# Patient Record
Sex: FEMALE | Race: WHITE | NOT HISPANIC OR LATINO | Employment: FULL TIME | URBAN - METROPOLITAN AREA
[De-identification: names, ages, dates, MRNs, and addresses within clinical notes are randomized per-mention and may not be internally consistent; named-entity substitution may affect disease eponyms.]

---

## 2019-07-31 ENCOUNTER — DOCUMENTATION (OUTPATIENT)
Dept: MULTI SPECIALTY CLINIC | Facility: CLINIC | Age: 54
End: 2019-07-31

## 2019-07-31 DIAGNOSIS — M20.22 HALLUX RIGIDUS OF LEFT FOOT: Primary | ICD-10-CM

## 2019-08-14 ENCOUNTER — HOSPITAL ENCOUNTER (OUTPATIENT)
Dept: RADIOLOGY | Facility: HOSPITAL | Age: 54
Discharge: HOME | End: 2019-08-14
Attending: PODIATRIST
Payer: COMMERCIAL

## 2019-08-14 DIAGNOSIS — M20.22 HALLUX RIGIDUS OF LEFT FOOT: ICD-10-CM

## 2019-08-14 PROCEDURE — 73630 X-RAY EXAM OF FOOT: CPT

## 2019-08-19 RX ORDER — BUPROPION HYDROCHLORIDE 150 MG/1
150 TABLET, EXTENDED RELEASE ORAL 2 TIMES DAILY
COMMUNITY
End: 2022-04-28

## 2019-08-19 RX ORDER — OMEGA-3 FATTY ACIDS/FISH OIL 300-1000MG
CAPSULE ORAL AS NEEDED
COMMUNITY

## 2019-08-19 NOTE — PRE-PROCEDURE INSTRUCTIONS
Pre-Surgery Instructions:   Medication Instructions    buPROPion (WELLBUTRIN SR) 150 mg 12 hr tablet Instructed patient per Anesthesia Guidelines   Ibuprofen (ADVIL) 200 MG CAPS Instructed patient per Anesthesia Guidelines  Patient prefers to take no meds the morning of surgery  Patient given/ instructed on use of chlorhexidine soap per hospital protocol    Patient instructed to stop all ASA, NSAIDS, vitamins and herbal supplements one week prior to surgery or per Dr Heath Rey

## 2019-08-23 ENCOUNTER — OFFICE VISIT (OUTPATIENT)
Dept: CARDIOLOGY CLINIC | Facility: CLINIC | Age: 54
End: 2019-08-23
Payer: COMMERCIAL

## 2019-08-23 VITALS
SYSTOLIC BLOOD PRESSURE: 134 MMHG | BODY MASS INDEX: 36.15 KG/M2 | HEIGHT: 65 IN | DIASTOLIC BLOOD PRESSURE: 88 MMHG | HEART RATE: 84 BPM | WEIGHT: 217 LBS

## 2019-08-23 DIAGNOSIS — R03.0 ELEVATED BLOOD PRESSURE READING: Primary | ICD-10-CM

## 2019-08-23 DIAGNOSIS — Z01.810 PREOP CARDIOVASCULAR EXAM: ICD-10-CM

## 2019-08-23 DIAGNOSIS — E78.5 HYPERLIPIDEMIA, UNSPECIFIED HYPERLIPIDEMIA TYPE: ICD-10-CM

## 2019-08-23 PROCEDURE — 99203 OFFICE O/P NEW LOW 30 MIN: CPT | Performed by: INTERNAL MEDICINE

## 2019-08-23 NOTE — PATIENT INSTRUCTIONS
Therapeutic lifestyle changes were discussed with the patient- Specifically:  1  Decreasing saturated fat intake to less than 13 g per day  I showed the pt how to look at a food label  Watch intake of cheese, red meat, cream and butter containing foods  2  Increase soluble fiber in the diet-beans, pears, oatmeal  3  Weight loss of even 5-10 pounds will also help to lower cholesterol levels  Decreasing caloric intake by about 100 rick a day-should lead to a weight loss of 1-2 pounds over one month  4  Increase aerobic physical activity - ultimate goal of 150 min per week  Start low and increase level and duration of activity slowly  5   Google 'TLC Cholesterol" = Your guide to lowering your cholesterol with TLC is probably the best online resource to lower your LDL cholesterol

## 2019-08-23 NOTE — PROGRESS NOTES
*/                                           Cardiology Consultation     Patricia Diaz  [id number]  1965  HEART & VASCULAR [facility]  [address] [facility] [place] [address]      There are no diagnoses linked to this encounter  I had the pleasure of seeing Patricia Diaz for a consultation regarding preop clearance requested by [facility]    History of the Presenting Illness, Discussion/Summary and my Plan are as follows:::    She is a pleasant 52-year-old lady with a history of hyperlipidemia-mild, no diagnosed hypertension, no diabetes, prior history of tobacco use-abou 33 pack years-quit about 4 years ago, a possible family history of premature coronary artery disease- biological father had heart attack followed by CABG at 48  However till 1 year ago she was physically very active-going for brisk walks-over 7 metabolic equivalents, without any cardiac symptoms  Subsequently she developed a bone spur in the left great to and her activity level has been limited by that  She will be undergoing surgery the same  Cardiac physical exam is unremarkable except for a systolic murmur of aortic sclerosis  Lipid profile-August 2019-total cholesterol 213, triglycerides 73, HDL 64, , A1c 5 6  A diet history was also obtained-high intake of red meat, moderate intake of cheese, minimal intake of butter and ice cream     ECG showed borderline LVH, otherwise normal (done at [facility])  Plan:    Preoperative evaluation prior to surgery-left great to-for bone spur:  Can proceed at low cardiac risk without further cardiac testing  ECG was essentially unremarkable  8/19/2019    Elevated blood pressures:  Does not have a diagnosis of hypertension, she will continue to monitor at home and let us know  Dyslipidemia:  Mild, therapeutic lifestyle changes were discussed, can be recheck in 3 months      I also told her to establish with a primary care physician for routine and preventative care  Follow-up in 6 months      No diagnosis found  There is no problem list on file for this patient  Past Medical History:   Diagnosis Date    Dental crown present     History of motion sickness     History of pneumonia     Left foot pain     "bone spur"    Right shoulder pain     and right rotator cuff problem    Stress     situational    Thalassemia     Wears contact lenses     Wears glasses      Social History     Socioeconomic History    Marital status: Single     Spouse name: Not on file    Number of children: Not on file    Years of education: Not on file    Highest education level: Not on file   Occupational History    Not on file   Social Needs    Financial resource strain: Not on file    Food insecurity:     Worry: Not on file     Inability: Not on file    Transportation needs:     Medical: Not on file     Non-medical: Not on file   Tobacco Use    Smoking status: Former Smoker    Smokeless tobacco: Never Used   Substance and Sexual Activity    Alcohol use: Yes     Comment: socially    Drug use: Never    Sexual activity: Not on file   Lifestyle    Physical activity:     Days per week: Not on file     Minutes per session: Not on file    Stress: Not on file   Relationships    Social connections:     Talks on phone: Not on file     Gets together: Not on file     Attends Faith service: Not on file     Active member of club or organization: Not on file     Attends meetings of clubs or organizations: Not on file     Relationship status: Not on file    Intimate partner violence:     Fear of current or ex partner: Not on file     Emotionally abused: Not on file     Physically abused: Not on file     Forced sexual activity: Not on file   Other Topics Concern    Not on file   Social History Narrative    Not on file      No family history on file    Past Surgical History:   Procedure Laterality Date    APPENDECTOMY      BREAST SURGERY Left     lump removed noncancer    MOUTH SURGERY      ROTATOR CUFF REPAIR Left     ROTATOR CUFF REPAIR Right     "eventually needs replacement"       Current Outpatient Medications:     buPROPion (WELLBUTRIN SR) 150 mg 12 hr tablet, Take 150 mg by mouth 2 (two) times a day, Disp: , Rfl:     Ibuprofen (ADVIL) 200 MG CAPS, Take by mouth as needed, Disp: , Rfl:   Allergies   Allergen Reactions    Erythromycin Vomiting     "projectile vomiting"    Morphine Headache     Category: Adverse Reaction;      Vitals:    08/23/19 1109   BP: 134/88   BP Location: Left arm   Patient Position: Sitting   Cuff Size: Large   Pulse: 84   Weight: 98 4 kg (217 lb)   Height: 5' 5" (1 651 m)         Imaging: No results found  Review of Systems:  Review of Systems   Constitutional: Negative  HENT: Negative  Eyes: Negative  Respiratory: Negative  Cardiovascular: Negative  Endocrine: Negative  Musculoskeletal: Negative  Physical Exam:    /88 (BP Location: Left arm, Patient Position: Sitting, Cuff Size: Large)   Pulse 84   Ht 5' 5" (1 651 m)   Wt 98 4 kg (217 lb)   BMI 36 11 kg/m²   Physical Exam   Constitutional: She appears well-developed and well-nourished  No distress  HENT:   Head: Normocephalic and atraumatic  Eyes: Pupils are equal, round, and reactive to light  Conjunctivae are normal  Right eye exhibits no discharge  Left eye exhibits no discharge  No scleral icterus  Neck: Normal range of motion  No JVD present  No tracheal deviation present  No thyromegaly present  Cardiovascular: Normal rate and regular rhythm  Murmur heard  Pulmonary/Chest: Effort normal and breath sounds normal  No stridor  No respiratory distress  She has no wheezes  Abdominal: Soft  Bowel sounds are normal  She exhibits no distension  There is no tenderness  There is no guarding  Musculoskeletal: Normal range of motion  She exhibits no edema, tenderness or deformity  Skin: Skin is warm  No rash noted   She is not diaphoretic  No erythema  No pallor

## 2019-08-23 NOTE — PROGRESS NOTES
HEART & VASCULAR Ashish Leonard  [facility] [address] [address]      PATIENT NAME: Christina Chanel; [id number]    YOB: 1965    DATE LAST EVALUATED: 8/23/19    DETAILS OF SURGERY: Left foot toe spur    DATE OF SURGERY: 8/28/19    SURGEON: Dr Whittington    ANESTHESIA: Choice     CARDIAC RISK ASSESSMENT:    Can proceed at Low risk without further testing which is unlikely to change the management

## 2019-08-23 NOTE — H&P (VIEW-ONLY)
HEART & VASCULAR Gabriel Mera  [facility] [address] [address]      PATIENT NAME: Velia Beach; [id number]    YOB: 1965    DATE LAST EVALUATED: 8/23/19    DETAILS OF SURGERY: Left foot toe spur    DATE OF SURGERY: 8/28/19    SURGEON: Dr Whittington    ANESTHESIA: Choice     CARDIAC RISK ASSESSMENT:    Can proceed at Low risk without further testing which is unlikely to change the management

## 2019-08-27 ENCOUNTER — ANESTHESIA EVENT (OUTPATIENT)
Dept: PERIOP | Facility: HOSPITAL | Age: 54
End: 2019-08-27
Payer: COMMERCIAL

## 2019-08-28 ENCOUNTER — HOSPITAL ENCOUNTER (OUTPATIENT)
Facility: HOSPITAL | Age: 54
Setting detail: OUTPATIENT SURGERY
Discharge: HOME | End: 2019-08-28
Attending: PODIATRIST | Admitting: PODIATRIST
Payer: COMMERCIAL

## 2019-08-28 ENCOUNTER — ANESTHESIA (OUTPATIENT)
Dept: PERIOP | Facility: HOSPITAL | Age: 54
End: 2019-08-28
Payer: COMMERCIAL

## 2019-08-28 ENCOUNTER — APPOINTMENT (OUTPATIENT)
Dept: RADIOLOGY | Facility: HOSPITAL | Age: 54
End: 2019-08-28
Payer: COMMERCIAL

## 2019-08-28 ENCOUNTER — HOSPITAL ENCOUNTER (OUTPATIENT)
Dept: RADIOLOGY | Facility: HOSPITAL | Age: 54
Setting detail: OUTPATIENT SURGERY
Discharge: HOME | End: 2019-08-28
Payer: COMMERCIAL

## 2019-08-28 VITALS
DIASTOLIC BLOOD PRESSURE: 65 MMHG | OXYGEN SATURATION: 97 % | HEART RATE: 85 BPM | HEIGHT: 65 IN | SYSTOLIC BLOOD PRESSURE: 123 MMHG | TEMPERATURE: 99.2 F | WEIGHT: 210 LBS | RESPIRATION RATE: 18 BRPM | BODY MASS INDEX: 34.99 KG/M2

## 2019-08-28 DIAGNOSIS — G89.18 POST-OPERATIVE PAIN: Primary | ICD-10-CM

## 2019-08-28 DIAGNOSIS — M19.072 PRIMARY OSTEOARTHRITIS, LEFT ANKLE AND FOOT: ICD-10-CM

## 2019-08-28 LAB
EXT PREGNANCY TEST URINE: NEGATIVE
EXT. CONTROL: NORMAL

## 2019-08-28 PROCEDURE — 81025 URINE PREGNANCY TEST: CPT | Performed by: ANESTHESIOLOGY

## 2019-08-28 PROCEDURE — 73630 X-RAY EXAM OF FOOT: CPT

## 2019-08-28 PROCEDURE — C1713 ANCHOR/SCREW BN/BN,TIS/BN: HCPCS | Performed by: PODIATRIST

## 2019-08-28 DEVICE — IMPLANTABLE DEVICE: Type: IMPLANTABLE DEVICE | Site: TOE | Status: FUNCTIONAL

## 2019-08-28 RX ORDER — ONDANSETRON 2 MG/ML
INJECTION INTRAMUSCULAR; INTRAVENOUS AS NEEDED
Status: DISCONTINUED | OUTPATIENT
Start: 2019-08-28 | End: 2019-08-28 | Stop reason: SURG

## 2019-08-28 RX ORDER — ACETAMINOPHEN 325 MG/1
975 TABLET ORAL EVERY 6 HOURS PRN
Status: DISCONTINUED | OUTPATIENT
Start: 2019-08-28 | End: 2019-08-28 | Stop reason: HOSPADM

## 2019-08-28 RX ORDER — EPHEDRINE SULFATE 50 MG/ML
INJECTION INTRAVENOUS AS NEEDED
Status: DISCONTINUED | OUTPATIENT
Start: 2019-08-28 | End: 2019-08-28 | Stop reason: SURG

## 2019-08-28 RX ORDER — SODIUM CHLORIDE 9 MG/ML
125 INJECTION, SOLUTION INTRAVENOUS CONTINUOUS
Status: DISCONTINUED | OUTPATIENT
Start: 2019-08-28 | End: 2019-08-28 | Stop reason: HOSPADM

## 2019-08-28 RX ORDER — CEFAZOLIN SODIUM 2 G/50ML
2000 SOLUTION INTRAVENOUS ONCE
Status: COMPLETED | OUTPATIENT
Start: 2019-08-28 | End: 2019-08-28

## 2019-08-28 RX ORDER — MAGNESIUM HYDROXIDE 1200 MG/15ML
LIQUID ORAL AS NEEDED
Status: DISCONTINUED | OUTPATIENT
Start: 2019-08-28 | End: 2019-08-28 | Stop reason: HOSPADM

## 2019-08-28 RX ORDER — KETOROLAC TROMETHAMINE 30 MG/ML
INJECTION, SOLUTION INTRAMUSCULAR; INTRAVENOUS AS NEEDED
Status: DISCONTINUED | OUTPATIENT
Start: 2019-08-28 | End: 2019-08-28 | Stop reason: SURG

## 2019-08-28 RX ORDER — LIDOCAINE HYDROCHLORIDE 20 MG/ML
INJECTION, SOLUTION EPIDURAL; INFILTRATION; INTRACAUDAL; PERINEURAL AS NEEDED
Status: DISCONTINUED | OUTPATIENT
Start: 2019-08-28 | End: 2019-08-28 | Stop reason: SURG

## 2019-08-28 RX ORDER — PROPOFOL 10 MG/ML
INJECTION, EMULSION INTRAVENOUS AS NEEDED
Status: DISCONTINUED | OUTPATIENT
Start: 2019-08-28 | End: 2019-08-28 | Stop reason: SURG

## 2019-08-28 RX ORDER — DEXAMETHASONE SODIUM PHOSPHATE 4 MG/ML
INJECTION, SOLUTION INTRA-ARTICULAR; INTRALESIONAL; INTRAMUSCULAR; INTRAVENOUS; SOFT TISSUE AS NEEDED
Status: DISCONTINUED | OUTPATIENT
Start: 2019-08-28 | End: 2019-08-28 | Stop reason: SURG

## 2019-08-28 RX ORDER — HYDROCODONE BITARTRATE AND ACETAMINOPHEN 5; 325 MG/1; MG/1
1 TABLET ORAL EVERY 6 HOURS PRN
Qty: 20 TABLET | Refills: 0 | Status: SHIPPED | OUTPATIENT
Start: 2019-08-28 | End: 2019-09-07

## 2019-08-28 RX ORDER — MIDAZOLAM HYDROCHLORIDE 1 MG/ML
INJECTION INTRAMUSCULAR; INTRAVENOUS AS NEEDED
Status: DISCONTINUED | OUTPATIENT
Start: 2019-08-28 | End: 2019-08-28 | Stop reason: SURG

## 2019-08-28 RX ORDER — ALBUTEROL SULFATE 2.5 MG/3ML
2.5 SOLUTION RESPIRATORY (INHALATION) ONCE AS NEEDED
Status: DISCONTINUED | OUTPATIENT
Start: 2019-08-28 | End: 2019-08-28 | Stop reason: HOSPADM

## 2019-08-28 RX ORDER — FENTANYL CITRATE 50 UG/ML
INJECTION, SOLUTION INTRAMUSCULAR; INTRAVENOUS AS NEEDED
Status: DISCONTINUED | OUTPATIENT
Start: 2019-08-28 | End: 2019-08-28 | Stop reason: SURG

## 2019-08-28 RX ORDER — MEPERIDINE HYDROCHLORIDE 50 MG/ML
12.5 INJECTION INTRAMUSCULAR; INTRAVENOUS; SUBCUTANEOUS AS NEEDED
Status: DISCONTINUED | OUTPATIENT
Start: 2019-08-28 | End: 2019-08-28 | Stop reason: HOSPADM

## 2019-08-28 RX ORDER — FENTANYL CITRATE/PF 50 MCG/ML
50 SYRINGE (ML) INJECTION
Status: DISCONTINUED | OUTPATIENT
Start: 2019-08-28 | End: 2019-08-28 | Stop reason: HOSPADM

## 2019-08-28 RX ORDER — OXYCODONE HYDROCHLORIDE AND ACETAMINOPHEN 5; 325 MG/1; MG/1
1 TABLET ORAL EVERY 4 HOURS PRN
Qty: 20 TABLET | Refills: 0 | Status: SHIPPED | OUTPATIENT
Start: 2019-08-28 | End: 2019-08-28 | Stop reason: HOSPADM

## 2019-08-28 RX ADMIN — FENTANYL CITRATE 50 MCG: 50 INJECTION, SOLUTION INTRAMUSCULAR; INTRAVENOUS at 11:00

## 2019-08-28 RX ADMIN — MIDAZOLAM 2 MG: 1 INJECTION INTRAMUSCULAR; INTRAVENOUS at 09:44

## 2019-08-28 RX ADMIN — PHENYLEPHRINE HYDROCHLORIDE 100 MCG: 10 INJECTION INTRAVENOUS at 10:01

## 2019-08-28 RX ADMIN — SODIUM CHLORIDE: 0.9 INJECTION, SOLUTION INTRAVENOUS at 10:24

## 2019-08-28 RX ADMIN — SODIUM CHLORIDE 125 ML/HR: 0.9 INJECTION, SOLUTION INTRAVENOUS at 11:40

## 2019-08-28 RX ADMIN — PHENYLEPHRINE HYDROCHLORIDE 100 MCG: 10 INJECTION INTRAVENOUS at 10:18

## 2019-08-28 RX ADMIN — SODIUM CHLORIDE 125 ML/HR: 0.9 INJECTION, SOLUTION INTRAVENOUS at 08:47

## 2019-08-28 RX ADMIN — EPHEDRINE SULFATE 10 MG: 50 INJECTION, SOLUTION INTRAVENOUS at 10:50

## 2019-08-28 RX ADMIN — FENTANYL CITRATE 50 MCG: 50 INJECTION, SOLUTION INTRAMUSCULAR; INTRAVENOUS at 09:48

## 2019-08-28 RX ADMIN — CEFAZOLIN SODIUM 2000 MG: 2 SOLUTION INTRAVENOUS at 09:37

## 2019-08-28 RX ADMIN — PHENYLEPHRINE HYDROCHLORIDE 100 MCG: 10 INJECTION INTRAVENOUS at 10:26

## 2019-08-28 RX ADMIN — PROPOFOL 50 MG: 10 INJECTION, EMULSION INTRAVENOUS at 11:02

## 2019-08-28 RX ADMIN — DEXAMETHASONE SODIUM PHOSPHATE 6 MG: 4 INJECTION, SOLUTION INTRAMUSCULAR; INTRAVENOUS at 09:58

## 2019-08-28 RX ADMIN — EPHEDRINE SULFATE 5 MG: 50 INJECTION, SOLUTION INTRAVENOUS at 10:30

## 2019-08-28 RX ADMIN — PHENYLEPHRINE HYDROCHLORIDE 100 MCG: 10 INJECTION INTRAVENOUS at 10:08

## 2019-08-28 RX ADMIN — ONDANSETRON 4 MG: 2 INJECTION INTRAMUSCULAR; INTRAVENOUS at 09:58

## 2019-08-28 RX ADMIN — KETOROLAC TROMETHAMINE 30 MG: 30 INJECTION, SOLUTION INTRAMUSCULAR at 11:14

## 2019-08-28 RX ADMIN — EPHEDRINE SULFATE 5 MG: 50 INJECTION, SOLUTION INTRAVENOUS at 10:40

## 2019-08-28 RX ADMIN — LIDOCAINE HYDROCHLORIDE 80 MG: 20 INJECTION, SOLUTION EPIDURAL; INFILTRATION; INTRACAUDAL; PERINEURAL at 09:49

## 2019-08-28 RX ADMIN — PROPOFOL 200 MG: 10 INJECTION, EMULSION INTRAVENOUS at 09:49

## 2019-08-28 NOTE — OP NOTE
OPERATIVE REPORT  PATIENT NAME: Carly Jeffery    :  1965  MRN: [id number]  Pt Location: [facility] OR ROOM 03    SURGERY DATE: 2019    Surgeon(s) and Role:     * Davey Whittington DPM - Primary     * Guanakito Fox DPM - Assisting     * Kat Ambrose DPM - Observing    Preop Diagnosis:  Primary osteoarthritis, left ankle and foot [M19 072]    Post-Op Diagnosis Codes:     * Primary osteoarthritis, left ankle and foot [M19 072]    Procedure(s) (LRB):  1ST MPJ TOTAL JOINT IMPLANT (Left)    Specimen(s):  * No specimens in log *    Estimated Blood Loss:   Minimal    Drains:  * No LDAs found *    Anesthesia Type:   Choice    Operative Indications:  Primary osteoarthritis, left ankle and foot [M19 072]    Operative Findings:  Consistent with diagnosis; significant osteochondral defects and cartilage destruction/denution to both 1st metatarsal head and proximal phalangeal base    Complications:   None    Procedure and Technique:    Under mild sedation, the patient was brought into the operating room and placed on the operating room table in the supine position  A pneumatic calf tourniquet was then placed around the patient's left calf with ample webril padding  A time out was performed to confirm the correct patient, procedure and site with all parties in agreement  Following IV sedation, an easley block was performed consisting of 20 ml of 1% Lidocaine and 0 5% Bupivacaine in a 1:1 mixture  The foot was then scrubbed, prepped and draped in the usual aseptic manner  An esmarch bandage was utilized to exsangunate the patients foot and the pneumatic calf tourniquet was then inflated  The esmarch bandage was removed and the foot was placed on the operating room table  Attention was directed to the dorsal aspect of the left 1st metatarsophalangeal joint where an approximately 6 cm linear incision was made through skin    The incision was deepened through subcutaneous tissues using sharp and blunt dissection with care to identify and retract all vital neural and vascular structures  All bleeders were cauterized and ligated as necessary  Capsulotomy was performed over the dorsal aspect of the MPJ  The periosteum capsular structures were then carefully dissected and freed of their osseous attachments and reflected medial laterally thus exposing the head of 1st metatarsal and base of the proximal phalanx at the operative site  The medial prominence of the 1st metatarsal head was then resected with a sagittal bone saw  Due to significant osteochondral defects and cartilage destruction/denution to both 1st metatarsal head and proximal phalangeal base a total implant was deemed necessary  Next using a sagittal saw the base of the proximal phalanx and the head of the 1st metatarsal were then both resected using appropriate Integra FGT implant guide  Next using appropriate reamers the head of the 1st metatarsal and base of the proximal phalanx was then reamed in a central location in order for the implant to fit  The wound was then flushed with copious amounts of normal sterile saline  Appropriately sized grommet was placed in the head of the metatarsal and other placed in the base of the proximal phalanx  These were tapped in appropriately to flush with the bone  The wound was then flushed with copious amounts of normal sterile saline  Next using proper AO technique one size 30 Integra FGT implant was appropriately placed across the 1st metatarsophalangeal joint in the 1st metatarsal and proximal phalanx  It is noted that range of motion was excellent  Bone cyst to medial aspect of the metatarsal head was filled with bone wax  Any further osteochondral lipping was smoothed with rongeur and rasp  The wound was then flushed with copious amounts of normal sterile saline  Capsular structures reapproximated using 3-0 Vicryl    Subcutaneous tissues reapproximated using 4-0 Vicryl and skin reapproximated using 3-0 nylon and running fashion  The pneumatic calf tourniquet was deflated at this time and prompt hyperemic response was noted all digits of left foot  Hemostasis adequately achieved  A postoperative injection of 20 mL 0 25% bupivacaine with epinephrine was then injected as ankle block  Incision was dressed with Xeroform, 4 x 4, Kerlix, Ace  Dr Anastacia George was present for the entire procedure and participated in all key surgical elements      Patient Disposition:  PACU     SIGNATURE: Cleveland Pratt DPM  DATE: August 28, 2019  TIME: 11:31 AM

## 2019-08-28 NOTE — DISCHARGE INSTRUCTIONS
Dr Banks Sers Instructions    1  Take your prescribed medication as directed; take one scheduled Alleve in the morning and one at night  Take Norco (Vicodin) as needed  2  Upon arrival at home, lie down and elevate your surgical foot on 2 pillows  3  Remain quiet, off your feet as much as possible until your next clinic visit  This is when your feet first swell and may become painful  After 48 hours you may begin limited walking following these restrictions:   Weightbear as tolerated to surgical foot but only to bathroom and back and ALWAYS in your surgical shoe  4  Drink large quantities of water  Consume no alcohol  Continue a well-balanced diet  5  Report any unusual discomfort or fever to this office  6  A limited amount of discomfort and swelling is to be expected  In some cases the skin may take on a bruised appearance  The surgical solution that was applied to your foot prior to the operation is dark in color and the operation site may appear to be oozing when it actually is not  7  A slight amount of blood is to be expected, and is no cause for alarm  Do not remove the dressings  If there is active bleeding and if the bleeding persists, add additional gauze to the bandage, apply direct pressure, elevate your feet and call this office  8  Do not get the dressings wet  As regular bathing may be inconvenient, sponge baths are recommended  9  When anesthesia wears off and if any discomfort should be present, apply an ice pack to ankle for 15 minute intervals for several hours or until the pain leaves  (USE IN EXCESS OF 15 MINUTES COULD CAUSE FROSTBITE)  Do not use hot water bags or electric pads  A convenient icepack can be made by placing ice cubes in a plastic bag and covering this with a towel  10  If necessary, take a mild laxative before retiring  11  Wear your special open shoes anytime you put weight on your foot, even if it is just to walk to the bathroom and back   It will probably be 2 or 3 weeks before you will be permitted to try regular shoes  12  Having performed the operation, we are interested in a prompt recovery  Please cooperate by following the above instructions  13  Please call to confirm your post-op appointment or call with any other questions

## 2019-08-28 NOTE — INTERVAL H&P NOTE
H&P reviewed  After examining the patient I find no changes in the patients condition since the H&P had been written      Vitals:    08/28/19 0757   BP: 107/53   Pulse: 85   Resp: 16   Temp: 97 6 °F (36 4 °C)   SpO2: 97%

## 2019-08-28 NOTE — DISCHARGE SUMMARY
Discharge Summary Outpatient Procedure Podiatry -   Christos Olivares 49 y o  female MRN: [id number]  Unit/Bed#: OR [facility] Encounter: [id number]    Admission Date: 8/28/2019     Admitting Diagnosis: Primary osteoarthritis, left ankle and foot [M19 072]    Discharge Diagnosis: same    Procedures Performed: 1ST MPJ TOTAL JOINT IMPLANT: 76599 (CPT®) LEFT FOOT    Complications: none    Condition at Discharge: stable    Discharge instructions/Information to patient and family:   See after visit summary for information provided to patient and family  Provisions for Follow-Up Care/Important appointments:  See after visit summary for information related to follow-up care and any pertinent home health orders  Discharge Medications:  See after visit summary for reconciled discharge medications provided to patient and family

## 2019-08-28 NOTE — ANESTHESIA PREPROCEDURE EVALUATION
Review of Systems/Medical History  Patient summary reviewed  Chart reviewed  No history of anesthetic complications     Cardiovascular  Hyperlipidemia,    Pulmonary  Smoker ex-smoker  ,        GI/Hepatic  Negative GI/hepatic ROS               Endo/Other  Negative endo/other ROS      GYN  Negative gynecology ROS          Hematology  Anemia (thallasemia) ,     Musculoskeletal       Neurology  Negative neurology ROS      Psychology   Negative psychology ROS              Physical Exam    Airway    Mallampati score: II  TM Distance: >3 FB  Neck ROM: full     Dental   Comment: crown,     Cardiovascular  Rhythm: regular, Rate: normal, Cardiovascular exam normal    Pulmonary  Pulmonary exam normal Breath sounds clear to auscultation,     Other Findings        Anesthesia Plan  ASA Score- 2     Anesthesia Type- general with ASA Monitors  Additional Monitors:   Airway Plan: LMA  Plan Factors-Patient not instructed to abstain from smoking on day of procedure  Patient did not smoke on day of surgery  Induction- intravenous  Postoperative Plan-     Informed Consent- Anesthetic plan and risks discussed with patient

## 2021-04-08 DIAGNOSIS — Z23 ENCOUNTER FOR IMMUNIZATION: ICD-10-CM

## 2022-04-20 ENCOUNTER — TELEPHONE (OUTPATIENT)
Dept: BARIATRICS | Facility: CLINIC | Age: 57
End: 2022-04-20

## 2022-04-20 NOTE — TELEPHONE ENCOUNTER
Spoke to patient about [facility] program and  program for bariatrics  Informed her of all the steps that would need to be completed for our program requirement in order for her to have surgery: Surgeon consult, 3 hour eval, support group, cardiology, labs  She informed me of her insurance, [employer]  Asked her to call her insurance to make sure she still has the coverage and if she needs to use a [facility]  Patient verbalized understanding of the realistic expectations of our program and the outcome/ timeline for surgery based off of her current insurance  Surgeon consult and 3 hour eval scheduled for next week to start the process with us  Chart marked for merge with [facility] d/t documentation needed for her chart  She stated her maiden name is Alexander Cooney, but [employer] is still on her insurance card so she cannot update her name in our system yet  Informed patient that most program requirements from [facility] will not be used for our program as they are about a year old and certain items needed to be repeated based on our specific program requirements  Informed patient if CBD is needed, she will have to have surgery at our [facility] and they book out further than Aline Carrasquillo does  She verbalized understanding stating, "if I have to pay out of pocket for my cobra insurance a few more months if this means I can get my surgery soon, I will "  Patient will bring insurance card to her 3 hour eval, scheduled for 4/28/22

## 2022-04-20 NOTE — TELEPHONE ENCOUNTER
Spoke with pt since she completed her online seminar  She stated she had cobra insurance and wanted to have her surgery next month  She started off with [facility] and she was given a surgery date of July 2021  She declined because her daughter was getting  and she would not be able to eat and drink at the wedding and that wasn't going to happen  She is also stating her cobra insurance is costing her more then 700 00 monthly and doesn't want to go through our process  She wants to be given a surgery date so she can then cancel her insurance      Call then transferred to [facility] to further assist

## 2022-04-21 ENCOUNTER — TELEPHONE (OUTPATIENT)
Dept: BARIATRICS | Facility: CLINIC | Age: 57
End: 2022-04-21

## 2022-04-21 NOTE — TELEPHONE ENCOUNTER
Email sent to patient:  Good morning Acacia Keraney,     I was able to view some records for [facility] and did not see an EGD completed within the last 2 years  Our doctors need one completed for surgery, but we can schedule that for you when you come in  Or, if youd like to get that planned for sooner, you can see Gastroenterology at [address] [place] [address] or [facility] and have them complete a screening EGD  Also, do you have a family doctor that you follow with? We do want you to follow up with your family doctor after surgery so both offices can closely follow you post operatively  Lastly, if you have a chance to send me your insurance info (I know its [employer], I just need your subscriber ID), Id like to prepare your chart  I look forward to meeting you next Thursday  Have a great day!

## 2022-04-21 NOTE — TELEPHONE ENCOUNTER
Email sent to the data integrity team to expedite merge of patient chart:   Hello,  I need assistance with merging a chart for a patient  She has 2 charts with us and [facility]  Our system has MRN [id number] for Vijay Hudson 1965  We also have a chart for the same patient with a different last name Octavio James 1965 MRN <[id number]>  She has records from [facility] in her Christie Hand MRN chart that I need merged with her other chart  If this request can be expedited as she has coordinated care with us starting Thursday 4/28/22, that would be appreciated  IT asked that I reach out to you for this specific request   Thank you!

## 2022-04-28 ENCOUNTER — PREP FOR PROCEDURE (OUTPATIENT)
Dept: BARIATRICS | Facility: CLINIC | Age: 57
End: 2022-04-28

## 2022-04-28 ENCOUNTER — CLINICAL SUPPORT (OUTPATIENT)
Dept: BARIATRICS | Facility: CLINIC | Age: 57
End: 2022-04-28

## 2022-04-28 VITALS
DIASTOLIC BLOOD PRESSURE: 82 MMHG | HEIGHT: 65 IN | SYSTOLIC BLOOD PRESSURE: 128 MMHG | BODY MASS INDEX: 44.15 KG/M2 | HEART RATE: 90 BPM | WEIGHT: 265 LBS

## 2022-04-28 DIAGNOSIS — E66.01 MORBID (SEVERE) OBESITY DUE TO EXCESS CALORIES (HCC): Primary | ICD-10-CM

## 2022-04-28 DIAGNOSIS — E66.01 MORBID (SEVERE) OBESITY DUE TO EXCESS CALORIES (HCC): ICD-10-CM

## 2022-04-28 DIAGNOSIS — E78.5 HYPERLIPIDEMIA, UNSPECIFIED HYPERLIPIDEMIA TYPE: Primary | ICD-10-CM

## 2022-04-28 DIAGNOSIS — Z01.818 PRE-OP TESTING: ICD-10-CM

## 2022-04-28 DIAGNOSIS — Z98.84 BARIATRIC SURGERY STATUS: Primary | ICD-10-CM

## 2022-04-28 PROCEDURE — RECHECK

## 2022-04-28 RX ORDER — ELECTROLYTES/DEXTROSE
1 SOLUTION, ORAL ORAL DAILY
COMMUNITY

## 2022-04-28 NOTE — PROGRESS NOTES
Bariatric Nutrition Assessment Note  Type of surgery    Preop (no weight checks)  Surgery Date: TBD   Surgeon: Dr Nash Crowley  (consult on 22)    Nutrition Assessment   Vero Tobias  65 y o   female     Wt with BMI of 25: 148#  Pre-Op Excess Wt: 117#  Height 5' 5" (1 651 m), weight 120 kg (265 lb)  Body mass index is 44 1 kg/m²  Weight History   Onset of Obesity: Adult, usual body weight was 145#    Gained most of the weight over the past 6 years since quit smoking  Family history of obesity: Yes  Wt Loss Attempts: Commercial Programs ([employer], [employer], etc )  Exercise  Self Created Diets (Portion Control, Healthy Food Choices, etc )  Noom  Maximum Wt Lost: 40-50lbs w/noom    Review of History and Medications   Past Medical History:   Diagnosis Date    Dental crown present     History of motion sickness     History of pneumonia     Left foot pain     "bone spur"    Right shoulder pain     and right rotator cuff problem    Stress     situational    Thalassemia     Wears contact lenses     Wears glasses      Past Surgical History:   Procedure Laterality Date    APPENDECTOMY      BREAST SURGERY Left     lump removed noncancer    MOUTH SURGERY      CA HALLUX RIGIDUS W/CHEILECTOMY 1ST MP JT W/IMPLT Left 2019    Procedure: 1ST MPJ TOTAL JOINT IMPLANT;  Surgeon: Jayden Machado DPM;  Location: [facility];  Service: Podiatry    ROTATOR CUFF REPAIR Left     ROTATOR CUFF REPAIR Right     "eventually needs replacement"     Social History     Socioeconomic History    Marital status:      Spouse name: Not on file    Number of children: Not on file    Years of education: Not on file    Highest education level: Not on file   Occupational History    Occupation:    Tobacco Use    Smoking status: Former Smoker     Years: 33 00     Types: Cigarettes     Quit date:      Years since quittin 3    Smokeless tobacco: Never Used    Tobacco comment: quit in  Vaping Use    Vaping Use: Never used   Substance and Sexual Activity    Alcohol use: Yes     Comment: socially    Drug use: Never    Sexual activity: Not on file   Other Topics Concern    Not on file   Social History Narrative    · Most recent tobacco use screenin2020      · Do you currently or have you served in the [employer]:   No        · Exercise level: Moderate  l      · Caffeine intake: Moderate  1 cup coffee/day     · Guns present in home: Yes      · Seat belts used routinely:   Yes      · Sunscreen used routinely:   Yes      · Smoke alarm in home: Yes      · Advance directive:   No     Last modified by graciela   2020, 15:35     Social Determinants of Health     Financial Resource Strain: Not on file   Food Insecurity: Not on file   Transportation Needs: Not on file   Physical Activity: Not on file   Stress: Not on file   Social Connections: Not on file   Intimate Partner Violence: Not on file   Housing Stability: Not on file       Current Outpatient Medications:     buPROPion (WELLBUTRIN SR) 150 mg 12 hr tablet, Take 150 mg by mouth 2 (two) times a day, Disp: , Rfl:     Ibuprofen (ADVIL) 200 MG CAPS, Take by mouth as needed, Disp: , Rfl:   Food Intake and Lifestyle Assessment   Food Intake Assessment completed via usual diet recall  Wake:  Works from home so wakes up 6:30-7am  Breakfast: 8am (starts work at [employer] OR greek yogurt w/fruit OR eggs w/ turkey sausage  Coffee (16oz) w/stevia +non-dairy creamer (maybe one tbsp)  Snack: -   Lunch:  If has lunch:  [place] yogurt + grapes + granola OR Kale/lettuce salad w/protein on top w/ low rick or ff dressing or skips about 4 out of 7 days per week (weekends skips lunch most often)  Snack: gets a sweet tooth so will do a SF jello or SF pudding or banana w/chocolate pudding and ff whipped cream  Dinner: 5-6:30pm:  Mostly a meat and veggies and very limited carbs  Snack: pretzels  Beverage intake: water, coffee/tea and alcohol  Protein supplement: has had premier in the past  Estimated protein intake per day: 70-80gm  Estimated fluid intake per day: 32-48oz water, 16oz coffee, middle of covid was as much as 6 beers per day, but now depends on what is going on  If friends are over then will have 6 drinks, but other days nothing (could have up to a case in a week)  Meals eaten away from home: 1x/week--chinese boneless spare ribs w/white rice  Typical meal pattern: 2-3 meals per day and 2 snacks per day  Eating Behaviors: Consumption of high calorie/ high fat foods, Consumption of high calorie beverages, Large portion sizes, Frequent snacking/ grazing, Mindless eating and Emotional eating  Food allergies or intolerances: Allergies   Allergen Reactions    Erythromycin Vomiting     "projectile vomiting"    Morphine Headache     Category: Adverse Reaction;      Cultural or Muslim considerations: none    Physical Assessment  Physical Activity  Types of exercise: used to go to the gym, then covid hit so stopped  Has a bike at home that could use  Current physical limitations: having foot issues  Has a replaced joint and a heel spur  Psychosocial Assessment   Support systems: parent(s) sibling(s) friend(s) relative(s)  Socioeconomic factors: none  Lives alone--does all the cooking and food shopping    Nutrition Diagnosis  Diagnosis: Overweight / Obesity (NC-3 3)  Related to: Physical inactivity and Excessive energy intake  As Evidenced by: BMI >25     Nutrition Prescription: Recommend the following diet  Regular    Interventions and Teaching   Discussed pre-op and post-op nutrition guidelines  Patient educated and handouts provided    Surgical changes to stomach / GI  Capacity of post-surgery stomach  Diet progression  Adequate hydration  Sugar and fat restriction to decrease "dumping syndrome"  Fat restriction to decrease steatorrhea  Expected weight loss  Weight loss plateaus/ possibility of weight regain  Exercise  Suggestions for pre-op diet  Nutrition considerations after surgery  Protein supplements  Meal planning and preparation  Appropriate carbohydrate, protein, and fat intake, and food/fluid choices to maximize safe weight loss, nutrient intake, and tolerance   Dietary and lifestyle changes  Possible problems with poor eating habits  Intuitive eating  Techniques for self monitoring and keeping daily food journal  Potential for food intolerance after surgery, and ways to deal with them including: lactose intolerance, nausea, reflux, vomiting, diarrhea, food intolerance, appetite changes, gas  Vitamin / Mineral supplementation of Multivitamin with minerals and Vitamin D    Education provided to: patient    Barriers to learning: No barriers identified  Readiness to change: preparation    Prior research on procedure: books, internet and friends or family    Comprehension: verbalizes understanding     Expected Compliance: good  Recommendations  Pt is an appropriate candidate for surgery   Yes  Evaluation / Monitoring  Dietitian to Monitor: Eating pattern as discussed Tolerance of nutrition prescription Body weight Lab values Physical activity  Pre-op weight loss:  Do not gain  Lose 5% by DOS w/lifestyle changes and possible pre-op diet:  -13# (252#)  Must maintain BMI of 40:  237#  Goals  Decrease alcohol intake  Food journal via baritastic  Exercise 30 minutes 5 times per week--can start with 10 mins 3x/week  Complete lesson plans 1-6  Eat 3 meals per day with protein at each meal  Can use a protein shake as a meal replacement  Eliminate mindless snacking  Time Spent:   1 Hour

## 2022-04-28 NOTE — PROGRESS NOTES
Bariatric Behavioral Health Evaluation    Presenting Problem: Manda Villagran,  1965  Patient struggles with weight management and the effect it has on her energy  She quit smoking about 6 years ago and was doing ok with managing her weight until COVID  She has tried the Noom program to lose the weight and was losing weight but then COVID happened and regained her weight  Patient didn't feel like she could go back to Noom without the support of the gym which she doesn't feel comfortable going to because of COVID  Is the patient seeking Bariatric Surgery Eval? Yes  If yes how long have you researched this surgery option  Patient has been considering surgery for over a year, she [facility] the [facility] with [facility] but felt they were not helping her towards surgery  Patient's siblings had the surgery, she's seen and spoken with them about their process  Realizes Post- Op Requirements? Yes     Pre-morbid level of function and history of present illness: Patient has elevated blood pressure and hyperlipidemia, experiences shortness of breath with movement  Psychiatric/Psychological Treatment Diagnosis: Patient denies any mental health diagnosis or substance use disorder  Patient does drink 3-4 beers a night and is a former smoker  Outpatient Counselor: No, she did go for grief counseling in the past after the death of her father  Psychiatrist No     Have you had Inpatient Treatment? No    Family Constellation (include relationship with each and Psych/Med HX)    Siblings  obesity and mental health illness    Domestic Violence No    Abuse History:  None    Social situations: living alone and has one grown daughter who lives outside of the home  Additional comments/stressors related to family/relationships/peer support: Patient struggles with weight and the stress it puts on their health  Patient not experiencing any other life stressors, excited to welcome her first grandchild soon    Patient's friends, brother, mom and sister know she's seeking surgery and are supportive  Physical/Psychological Assessment:     Appearance: appropriate  Sociability: average  Affect: appropriate  Mood: calm  Thought Process: coherent  Speech: normal  Content: no impairment  Orientation: person  Yes , place  Yes , time  Yes , normal attention span  Yes , normal memory  Yes  , decreased in concentration ability  No and normal judgement  Yes   Insight: emotional  good    Risk Assessment:     none    Recommendations: Recommended for surgery  yes and Patient meets the criteria to be a member of [facility] Bariatric surgery program      Risk of Harm to Self or Others:     Observation:     Access to weapons yes     Weapons secured by patient locked in a safe  Based on the previous information, the client presents the following risk of harm to self or others: low    BARIATRIC [place]    I have received education related to my bariatric surgery process and understand:    Patients may be required to complete a psychiatric evaluation and receive clearance for surgery from their psychiatrist     Patients who undergo weight loss surgery are at higher risk of increased mental health concerns and suicide attempts  Patients may be required to complete a full substance abuse evaluation and then complete all treatment recommendations prior to surgery  If diagnosis of abuse/dependence results, patient may be required to remain sober for one (1) year before having bariatric surgery  Patients on psychiatric medications should check with their provider to discuss psychiatric medications and the changes in absorption  Patient should discuss all time release medications with provider and take all medications as prescribed  The recommendation is that there is no use of  any tobacco products, Hookah or  vapes for the bariatric post-operation patient      Bariatric surgery patients should not consume alcohol as a post-operative patient as it may increase risk of numerous health conditions including but not limited to alcohol abuse and ulcers  There is a possibility of weight regain if patient does not follow all program guidelines and recommendations  Bariatric surgery patients should exercise thirty (30) to sixty (60) minutes per day to maintain post-surgical weight loss  Research indicates that bariatric patients are more successful when they see a therapist for up to two (2) years post-op  Patients will follow all medical and dietary recommendations provided  Patient will keep all scheduled appointments and follow up with their physician for a minimum of five (5) years  Patient will take all vitamins as recommended  Post-operative vitamins are life-long  Patient reviewed Bariatric Surgery Education Checklist and agrees they have received education on these issues   Note: Patient denies any mental health diagnosis or substance use disorder  She will drink 3-4 beers a night and is a former smoker  Risk of alcohol and tobacco use post op were discussed  Emphasis on abstaining from alcohol was made to avoid any post-op complications and transfer addictions  Patient voiced understanding, has also received this information from her friends and siblings who have had the surgery and is committed to abstaining  Impact of hormones on female reproduction post-op were discussed  Patient is not currently pregnant and doesn't plan to become pregnant within one year of surgery  Patient is appropriate for surgery and recommended to meet with surgeon    Lacey Adrian LCSW

## 2022-04-29 ENCOUNTER — OFFICE VISIT (OUTPATIENT)
Dept: BARIATRICS | Facility: CLINIC | Age: 57
End: 2022-04-29
Payer: COMMERCIAL

## 2022-04-29 VITALS
SYSTOLIC BLOOD PRESSURE: 130 MMHG | BODY MASS INDEX: 44.12 KG/M2 | HEIGHT: 65 IN | DIASTOLIC BLOOD PRESSURE: 86 MMHG | WEIGHT: 264.8 LBS | HEART RATE: 94 BPM

## 2022-04-29 DIAGNOSIS — Z12.11 SCREENING FOR COLON CANCER: ICD-10-CM

## 2022-04-29 DIAGNOSIS — E66.01 OBESITY, CLASS III, BMI 40-49.9 (MORBID OBESITY) (HCC): Primary | ICD-10-CM

## 2022-04-29 DIAGNOSIS — Z12.31 ENCOUNTER FOR SCREENING MAMMOGRAM FOR MALIGNANT NEOPLASM OF BREAST: ICD-10-CM

## 2022-04-29 DIAGNOSIS — E78.5 HYPERLIPIDEMIA, UNSPECIFIED HYPERLIPIDEMIA TYPE: ICD-10-CM

## 2022-04-29 PROCEDURE — 99204 OFFICE O/P NEW MOD 45 MIN: CPT | Performed by: SURGERY

## 2022-04-29 NOTE — PROGRESS NOTES
BARIATRIC INITIAL CONSULT - BARIATRIC SURGERY    Galdino Kelley 65 y o  female MRN: [id number]  Unit/Bed#:  Encounter: [id number]      HPI:  Galdino Kelley is a very pleasant 65 y o  female who presents with a longstanding history of morbid obesity and inability to sustain a meaningful weight loss  Here today to discuss bariatric options  She is a  for [employer]  Body mass index is 44 75 kg/m²  ++Suffers from HLD  S/p L rotator cuff repair, L foot sx, OPEN appy     Visit type: consultation     Symptoms: excess weight, weight increase, inability to loss weight and fatigue    Associated Symptoms: none    Associated Conditions: hyperlipidemia and abdominal obesity  Disease Complications: none  Weight Loss Interest: high  Previous Diet Trials: low carb    Exercise Frequency:infrequency  Types of Exercise: walking      Review of Systems   Constitutional: Negative  Respiratory: Negative  Cardiovascular: Negative  Gastrointestinal: Negative  Musculoskeletal: Negative  Neurological: Negative  All other systems reviewed and are negative        Historical Information   Past Medical History:   Diagnosis Date    Dental crown present     History of motion sickness     History of pneumonia     Left foot pain     "bone spur"    Right shoulder pain     and right rotator cuff problem    Stress     situational    Thalassemia     Wears contact lenses     Wears glasses      Past Surgical History:   Procedure Laterality Date    APPENDECTOMY      BREAST SURGERY Left     lump removed noncancer    MOUTH SURGERY      [facility] HALLUX RIGIDUS W/CHEILECTOMY 1ST MP JT W/IMPLT Left 8/28/2019    Procedure: 1ST MPJ TOTAL JOINT IMPLANT;  Surgeon: Lobo Hernandez DPM;  Location: [facility];  Service: Podiatry    ROTATOR CUFF REPAIR Left     ROTATOR CUFF REPAIR Right     "eventually needs replacement"     Social History   Social History     Substance and Sexual Activity   Alcohol Use Yes    Comment: socially     Social History     Substance and Sexual Activity   Drug Use Never     Social History     Tobacco Use   Smoking Status Former Smoker    Years: 33 00    Types: Cigarettes    Quit date:     Years since quittin 3   Smokeless Tobacco Never Used   Tobacco Comment    quit in 2016      Family History:   Family History   Problem Relation Age of Onset    Thyroid disease Mother         parathyroid removed    Heart disease Father     Hodgkin's lymphoma Sister        Meds/Allergies   all medications and allergies reviewed  Allergies   Allergen Reactions    Erythromycin Vomiting     "projectile vomiting"    Morphine Headache     Category: Adverse Reaction;        Objective       Current Vitals:   /86 (BP Location: Left arm, Patient Position: Sitting, Cuff Size: Large)   Pulse 94   Ht 5' 4 5" (1 638 m)   Wt 120 kg (264 lb 12 8 oz)   BMI 44 75 kg/m²       Physical Exam  Vitals reviewed  Constitutional:       Appearance: She is well-developed  HENT:      Head: Normocephalic  Eyes:      Extraocular Movements: Extraocular movements intact  Cardiovascular:      Rate and Rhythm: Normal rate  Pulmonary:      Effort: Pulmonary effort is normal    Abdominal:      General: There is no distension  Musculoskeletal:         General: Normal range of motion  Cervical back: Normal range of motion  Neurological:      Mental Status: She is alert and oriented to person, place, and time  Psychiatric:         Mood and Affect: Mood normal          Behavior: Behavior normal          Thought Content: Thought content normal          Judgment: Judgment normal          Lab Results: I have personally reviewed pertinent lab results  Imaging: I have personally reviewed pertinent reports  EKG, Pathology, and Other Studies: I have personally reviewed pertinent reports          Assessment/PLAN:    65 y o  yo female with a long standing h/o of obesity and inability to sustain any meaningful weight loss on her own despite several attempts  She is interested in the Laparoscopic sleeve gastrectomy  I have discussed with the patient that 20-30% of patient's may experience worsened GERD and 18% risk of Wallis's esophagitis requiring surveillance EGDs after a sleeve gastrectomy  The patient understands this fully and accepts the risks to undergo a sleeve gastrectomy     -  Needs EGD/colonoscopy; instructed her to schedule for both with GI    I have explained our Enhanced Recovery After Bariatric Surgery (ERABS) protocol and benefits including preoperative, intraoperative and postoperative elements  As a part of her pre op evaluation, she will be referred to a cardiologist     She needs an EGD to evaluate the anatomy of her GI tract prior to the operation  I have spent over 45 minutes with her face to face in the office today discussing her options and details of the surgery  We have seen an animation of the surgery on the computer that illustrates how the operation is done and how the anatomy will be altered with the procedure  Over 50% of this was coordinating care  She was given the opportunity to ask questions and I have answered all of them  I have discussed and educated the patient with regards to the components of our multidisciplinary program and the importance of compliance and follow up in the post operative period  The patient was also instructed with regards to the importance of behavior modification, nutritional counseling, support meeting attendance and lifestyle changes that are important to ensure success  Although there is a great statistical chance of improvement or even resolution of most of her associated comorbidities, the results vary from patient to patient and they largely depend on her commitment and compliance       Nasim Baird MD, FACS, [facility]  4/29/2022  10:20 AM

## 2022-04-29 NOTE — LETTER
April 29, 2022     Mesfin Luasin, [address]  [address] [place] [address]    Patient: Danita Sosa   YOB: 1965   Date of Visit: 4/29/2022       Dear Dr Michela Zheng:    Thank you for referring Danita Sosa to me for evaluation for bariatric surgery  Below are my notes for this consultation  If you have questions, please do not hesitate to call me on my cell phone at [phone]  I look forward to following your patient along with you  Sincerely,    Oly Mccarty MD, [facility]  Metabolic & Bariatric Surgery Director  [facility] - [facility]   4/29/2022  10:46 AM        CC: No Recipients  Peewee Vang MD  4/29/2022 10:45 AM  Sign when Signing Visit      [address] 65 y o  female MRN: [id number]  Unit/Bed#:  Encounter: [id number]      HPI:  Danita Sosa is a very pleasant 65 y o  female who presents with a longstanding history of morbid obesity and inability to sustain a meaningful weight loss  Here today to discuss bariatric options  She is a  for [employer]  Body mass index is 44 75 kg/m²  ++Suffers from HLD  S/p L rotator cuff repair, L foot sx, OPEN appy     Visit type: consultation     Symptoms: excess weight, weight increase, inability to loss weight and fatigue    Associated Symptoms: none    Associated Conditions: hyperlipidemia and abdominal obesity  Disease Complications: none  Weight Loss Interest: high  Previous Diet Trials: low carb    Exercise Frequency:infrequency  Types of Exercise: walking      Review of Systems   Constitutional: Negative  Respiratory: Negative  Cardiovascular: Negative  Gastrointestinal: Negative  Musculoskeletal: Negative  Neurological: Negative  All other systems reviewed and are negative        Historical Information   Past Medical History:   Diagnosis Date    Dental crown present     History of motion sickness     History of pneumonia     Left foot pain     "bone spur"    Right shoulder pain     and right rotator cuff problem    Stress     situational    Thalassemia     Wears contact lenses     Wears glasses      Past Surgical History:   Procedure Laterality Date    APPENDECTOMY      BREAST SURGERY Left     lump removed noncancer    MOUTH SURGERY      [place] HALLUX RIGIDUS W/CHEILECTOMY 1ST MP JT W/IMPLT Left 2019    Procedure: 1ST MPJ TOTAL JOINT IMPLANT;  Surgeon: Jenifer Monaco DPM;  Location: [facility];  Service: Podiatry    ROTATOR CUFF REPAIR Left     ROTATOR CUFF REPAIR Right     "eventually needs replacement"     Social History   Social History     Substance and Sexual Activity   Alcohol Use Yes    Comment: socially     Social History     Substance and Sexual Activity   Drug Use Never     Social History     Tobacco Use   Smoking Status Former Smoker    Years: 33 00    Types: Cigarettes    Quit date:     Years since quittin 3   Smokeless Tobacco Never Used   Tobacco Comment    quit in       Family History:   Family History   Problem Relation Age of Onset    Thyroid disease Mother         parathyroid removed    Heart disease Father     Hodgkin's lymphoma Sister        Meds/Allergies   all medications and allergies reviewed  Allergies   Allergen Reactions    Erythromycin Vomiting     "projectile vomiting"    Morphine Headache     Category: Adverse Reaction;        Objective       Current Vitals:   /86 (BP Location: Left arm, Patient Position: Sitting, Cuff Size: Large)   Pulse 94   Ht 5' 4 5" (1 638 m)   Wt 120 kg (264 lb 12 8 oz)   BMI 44 75 kg/m²       Physical Exam  Vitals reviewed  Constitutional:       Appearance: She is well-developed  HENT:      Head: Normocephalic  Eyes:      Extraocular Movements: Extraocular movements intact  Cardiovascular:      Rate and Rhythm: Normal rate     Pulmonary:      Effort: Pulmonary effort is normal    Abdominal:      General: There is no distension  Musculoskeletal:         General: Normal range of motion  Cervical back: Normal range of motion  Neurological:      Mental Status: She is alert and oriented to person, place, and time  Psychiatric:         Mood and Affect: Mood normal          Behavior: Behavior normal          Thought Content: Thought content normal          Judgment: Judgment normal          Lab Results: I have personally reviewed pertinent lab results  Imaging: I have personally reviewed pertinent reports  EKG, Pathology, and Other Studies: I have personally reviewed pertinent reports  Assessment/PLAN:    65 y o  yo female with a long standing h/o of obesity and inability to sustain any meaningful weight loss on her own despite several attempts  She is interested in the Laparoscopic sleeve gastrectomy  I have discussed with the patient that 20-30% of patient's may experience worsened GERD and 18% risk of Wallis's esophagitis requiring surveillance EGDs after a sleeve gastrectomy  The patient understands this fully and accepts the risks to undergo a sleeve gastrectomy     -  Needs EGD/colonoscopy; instructed her to schedule for both with GI    I have explained our Enhanced Recovery After Bariatric Surgery (ERABS) protocol and benefits including preoperative, intraoperative and postoperative elements  As a part of her pre op evaluation, she will be referred to a cardiologist     She needs an EGD to evaluate the anatomy of her GI tract prior to the operation  I have spent over 45 minutes with her face to face in the office today discussing her options and details of the surgery  We have seen an animation of the surgery on the computer that illustrates how the operation is done and how the anatomy will be altered with the procedure  Over 50% of this was coordinating care  She was given the opportunity to ask questions and I have answered all of them    I have discussed and educated the patient with regards to the components of our multidisciplinary program and the importance of compliance and follow up in the post operative period  The patient was also instructed with regards to the importance of behavior modification, nutritional counseling, support meeting attendance and lifestyle changes that are important to ensure success  Although there is a great statistical chance of improvement or even resolution of most of her associated comorbidities, the results vary from patient to patient and they largely depend on her commitment and compliance       Chari Fabry, MD, FACS, [facility]  4/29/2022  10:20 AM

## 2022-05-10 ENCOUNTER — TELEPHONE (OUTPATIENT)
Dept: BARIATRICS | Facility: CLINIC | Age: 57
End: 2022-05-10

## 2022-05-23 ENCOUNTER — TELEPHONE (OUTPATIENT)
Dept: BARIATRICS | Facility: CLINIC | Age: 57
End: 2022-05-23

## 2022-05-23 NOTE — TELEPHONE ENCOUNTER
Left message for patient  She had canceled her EGD with us and then her follow up on 5/16  Wanted to check in and see how she was, see if she needed any support during this process, and to get her pre op check in rescheduled  Asked patient to call the office and ask for Kimberlee Knox or Helen to check in

## 2022-06-01 ENCOUNTER — TELEPHONE (OUTPATIENT)
Dept: BARIATRICS | Facility: CLINIC | Age: 57
End: 2022-06-01

## 2022-06-20 ENCOUNTER — TELEPHONE (OUTPATIENT)
Dept: BARIATRICS | Facility: CLINIC | Age: 57
End: 2022-06-20

## 2022-06-20 NOTE — TELEPHONE ENCOUNTER
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

## 2022-06-21 ENCOUNTER — OFFICE VISIT (OUTPATIENT)
Dept: GASTROENTEROLOGY | Facility: AMBULARY SURGERY CENTER | Age: 57
End: 2022-06-21
Payer: COMMERCIAL

## 2022-06-21 VITALS
SYSTOLIC BLOOD PRESSURE: 130 MMHG | DIASTOLIC BLOOD PRESSURE: 78 MMHG | HEART RATE: 83 BPM | WEIGHT: 249.6 LBS | HEIGHT: 64 IN | OXYGEN SATURATION: 99 % | BODY MASS INDEX: 42.61 KG/M2

## 2022-06-21 DIAGNOSIS — Z12.11 COLON CANCER SCREENING: Primary | ICD-10-CM

## 2022-06-21 DIAGNOSIS — Z98.84 BARIATRIC SURGERY STATUS: ICD-10-CM

## 2022-06-21 PROCEDURE — 99204 OFFICE O/P NEW MOD 45 MIN: CPT | Performed by: PHYSICIAN ASSISTANT

## 2022-06-21 RX ORDER — SODIUM PICOSULFATE, MAGNESIUM OXIDE, AND ANHYDROUS CITRIC ACID 10; 3.5; 12 MG/160ML; G/160ML; G/160ML
LIQUID ORAL
Qty: 320 ML | Refills: 0 | Status: SHIPPED | OUTPATIENT
Start: 2022-06-21 | End: 2022-07-14 | Stop reason: ALTCHOICE

## 2022-06-21 NOTE — PATIENT INSTRUCTIONS
Scheduled date of EGD/colonoscopy (as of today):7/14/2022  Physician performing EGD/colonoscopy:DR METCALF  Location of EGD/colonoscopy:[facility]  Desired bowel prep reviewed with patient:CLENPIQ PER MEINARDO  Instructions reviewed with patient by:DIANA VILLEGAS  Clearances:

## 2022-06-21 NOTE — PROGRESS NOTES
[facility] - Outpatient Consultation  Kristin Cantor 65 y o  female MRN: [id number]  Encounter: [id number]          ASSESSMENT AND PLAN:      1  Patient being screened prior to bariatric surgery, patient being planned for gastric sleeve, she has never had EGD  Also reports she has never had colonoscopy and is in need of colorectal cancer screening  No alarm symptoms at this time  Would benefit from EGD and colonoscopy concurrently     - will plan for EGD and colonoscopy, including gastric biopsies for H pylori    -procedures were explained in detail to the patient at this time including associated risks and benefits, risks including but not limited to infection, perforation bleeding     -prescription and instructions provided for colonoscopy prep  ______________________________________________________________________    HPI:  52-year-old female with history of obesity currently being worked up prior to plan for gastric sleeve procedure, presents to our office reporting she was asked to see us for EGD prior to bariatric surgery  She also has never had a colonoscopy in was need of colon cancer screening  She has never had EGD or colonoscopy before  Denies any longstanding acid reflux symptoms, any dysphagia, any disturbances to her bowel habits, any blood or mucus in her stools  No family history of colon cancer or stomach cancer  She tells me she will drink a couple of alcoholic beverages a week on average, no known history of liver problems  Does not take any pharmacologic anticoagulation  Denies any history of heart or lung problems, any supplemental oxygen dependence, any history of problems with anesthesia  REVIEW OF SYSTEMS:    CONSTITUTIONAL: Denies any fever, chills, rigors, and weight loss  HEENT: No earache or tinnitus  Denies hearing loss or visual disturbances  CARDIOVASCULAR: No chest pain or palpitations     RESPIRATORY: Denies any cough, hemoptysis, shortness of breath or dyspnea on exertion  GASTROINTESTINAL: As noted in the History of Present Illness  GENITOURINARY: No problems with urination  Denies any hematuria or dysuria  NEUROLOGIC: No dizziness or vertigo, denies headaches  MUSCULOSKELETAL: Denies any muscle or joint pain  SKIN: Denies skin rashes or itching  ENDOCRINE: Denies excessive thirst  Denies intolerance to heat or cold  PSYCHOSOCIAL: Denies depression or anxiety  Denies any recent memory loss         Historical Information   Past Medical History:   Diagnosis Date    Dental crown present     History of motion sickness     History of pneumonia     Left foot pain     "bone spur"    Right shoulder pain     and right rotator cuff problem    Stress     situational    Thalassemia     Wears contact lenses     Wears glasses      Past Surgical History:   Procedure Laterality Date    APPENDECTOMY      BREAST SURGERY Left     lump removed noncancer    MOUTH SURGERY      [place] HALLUX RIGIDUS W/CHEILECTOMY 1ST MP JT W/IMPLT Left 2019    Procedure: 1ST MPJ TOTAL JOINT IMPLANT;  Surgeon: Cipriano Damon DPM;  Location: [facility] OR;  Service: Podiatry    ROTATOR CUFF REPAIR Left     ROTATOR CUFF REPAIR Right     "eventually needs replacement"     Social History   Social History     Substance and Sexual Activity   Alcohol Use Yes    Comment: socially     Social History     Substance and Sexual Activity   Drug Use Never     Social History     Tobacco Use   Smoking Status Former Smoker    Years: 33 00    Types: Cigarettes    Quit date:     Years since quittin 4   Smokeless Tobacco Never Used   Tobacco Comment    quit in 2016      Family History   Problem Relation Age of Onset    Thyroid disease Mother         parathyroid removed    Heart disease Father     Hodgkin's lymphoma Sister        Meds/Allergies       Current Outpatient Medications:     BIOTIN PO    Cholecalciferol 25 MCG (1000 UT) capsule    Ibuprofen (ADVIL) 200 MG CAPS    Multiple Vitamin (Multivitamin Adult) TABS    Allergies   Allergen Reactions    Erythromycin Vomiting     "projectile vomiting"    Morphine Headache     Category: Adverse Reaction;            Objective     Blood pressure 130/78, pulse 83, height 5' 4" (1 626 m), weight 113 kg (249 lb 9 6 oz), SpO2 99 %  Body mass index is 42 84 kg/m²  PHYSICAL EXAM:      General Appearance:   Alert, cooperative, no distress   HEENT:   Normocephalic, atraumatic, anicteric      Neck:  Supple, symmetrical, trachea midline   Lungs:   Clear to auscultation bilaterally; no rales, rhonchi or wheezing; respirations unlabored    Heart[de-identified]   Regular rate and rhythm; no murmur, rub, or gallop  Abdomen:   Soft, non-tender, non-distended; normal bowel sounds; no masses, no organomegaly    Genitalia:   Deferred    Rectal:   Deferred    Extremities:  No cyanosis, clubbing or edema    Pulses:  2+ and symmetric    Skin:  No jaundice, rashes, or lesions    Lymph nodes:  No palpable cervical lymphadenopathy        Lab Results:   No visits with results within 1 Day(s) from this visit  Latest known visit with results is:   Admission on 08/28/2019, Discharged on 08/28/2019   Component Date Value    EXT Preg Test, Ur 08/28/2019 Negative     Control 08/28/2019 Valid          Radiology Results:   No results found

## 2022-06-27 NOTE — TELEPHONE ENCOUNTER
Message left to let patient know we do need to schedule an in person visit to get updated weight, weight at EGD cannot be accepted  SW highlighted that insurance needs to see updated weights at visits and it's also a programmatic requirement  Offer to alternate in person and virtual visits to help with her work schedule, direct number given to call back when available

## 2022-07-06 ENCOUNTER — ANESTHESIA (OUTPATIENT)
Dept: ANESTHESIOLOGY | Facility: HOSPITAL | Age: 57
End: 2022-07-06

## 2022-07-06 ENCOUNTER — ANESTHESIA EVENT (OUTPATIENT)
Dept: ANESTHESIOLOGY | Facility: HOSPITAL | Age: 57
End: 2022-07-06

## 2022-07-07 NOTE — TELEPHONE ENCOUNTER
Spoke with patient about scheduling an in person weight check to continue her surgical process  Patient said she didn't know with her work schedule when she would be able to get in for that visit, she said since her insurance doesn't require weight checks she doesn't understand why she needs to come in   SW explained that weight checks are also a programmatic requirement, it gives the opportunity to offer education about the process and collect data on patient progress to get them safely to surgery  TARYN empathized that the process is demanding but this is a standard we hold to make sure our patients are very prepared, we are able to offer alternating in person and virtual visits each month to accommodate her schedule  Patient said she could come in on 7/14 since she's already off that day for her EGD, scheduled with RD for 3:30 that day    Workflow reviewed, told patient that if her labs, PCP letter, and support group are done by her cardiac 8/1 we may be able to submit to insurance soon after her

## 2022-07-12 ENCOUNTER — TELEPHONE (OUTPATIENT)
Dept: OTHER | Facility: OTHER | Age: 57
End: 2022-07-12

## 2022-07-12 NOTE — TELEPHONE ENCOUNTER
Patient called in after going to pharmacy to  bowel prep ordered Clinpiq and was to told it was special ordered and it would cost $100 00  If confirmed, patient would like an alternative bowel prep  Colonoscopy scheduled for 7/14/22  Please follow up with patient

## 2022-07-12 NOTE — TELEPHONE ENCOUNTER
Spoke with patient, advised mag citrate/ miralax OTC prep option  Instructions emailed to patient as requested  Patient verbalized understanding, no further questions

## 2022-07-14 ENCOUNTER — ANESTHESIA (OUTPATIENT)
Dept: GASTROENTEROLOGY | Facility: AMBULARY SURGERY CENTER | Age: 57
End: 2022-07-14

## 2022-07-14 ENCOUNTER — ANESTHESIA EVENT (OUTPATIENT)
Dept: GASTROENTEROLOGY | Facility: AMBULARY SURGERY CENTER | Age: 57
End: 2022-07-14

## 2022-07-14 ENCOUNTER — HOSPITAL ENCOUNTER (OUTPATIENT)
Dept: GASTROENTEROLOGY | Facility: AMBULARY SURGERY CENTER | Age: 57
Setting detail: OUTPATIENT SURGERY
Discharge: HOME | End: 2022-07-14
Attending: INTERNAL MEDICINE | Admitting: INTERNAL MEDICINE
Payer: COMMERCIAL

## 2022-07-14 VITALS
TEMPERATURE: 98.1 F | DIASTOLIC BLOOD PRESSURE: 67 MMHG | SYSTOLIC BLOOD PRESSURE: 164 MMHG | OXYGEN SATURATION: 96 % | RESPIRATION RATE: 22 BRPM | HEART RATE: 92 BPM | WEIGHT: 265 LBS | HEIGHT: 65 IN | BODY MASS INDEX: 44.15 KG/M2

## 2022-07-14 DIAGNOSIS — Z12.11 COLON CANCER SCREENING: ICD-10-CM

## 2022-07-14 DIAGNOSIS — Z98.84 BARIATRIC SURGERY STATUS: ICD-10-CM

## 2022-07-14 DIAGNOSIS — K29.00 ACUTE SUPERFICIAL GASTRITIS WITHOUT HEMORRHAGE: Primary | ICD-10-CM

## 2022-07-14 LAB
EXT PREGNANCY TEST URINE: NEGATIVE
EXT. CONTROL: NORMAL

## 2022-07-14 PROCEDURE — 43239 EGD BIOPSY SINGLE/MULTIPLE: CPT | Performed by: INTERNAL MEDICINE

## 2022-07-14 PROCEDURE — 88305 TISSUE EXAM BY PATHOLOGIST: CPT | Performed by: PATHOLOGY

## 2022-07-14 PROCEDURE — 45385 COLONOSCOPY W/LESION REMOVAL: CPT | Performed by: INTERNAL MEDICINE

## 2022-07-14 PROCEDURE — 81025 URINE PREGNANCY TEST: CPT | Performed by: INTERNAL MEDICINE

## 2022-07-14 PROCEDURE — 45380 COLONOSCOPY AND BIOPSY: CPT | Performed by: INTERNAL MEDICINE

## 2022-07-14 RX ORDER — FAMOTIDINE 20 MG/1
20 TABLET, FILM COATED ORAL 2 TIMES DAILY
Qty: 60 TABLET | Refills: 11 | Status: SHIPPED | OUTPATIENT
Start: 2022-07-14

## 2022-07-14 RX ORDER — LIDOCAINE HYDROCHLORIDE 10 MG/ML
INJECTION, SOLUTION EPIDURAL; INFILTRATION; INTRACAUDAL; PERINEURAL AS NEEDED
Status: DISCONTINUED | OUTPATIENT
Start: 2022-07-14 | End: 2022-07-14

## 2022-07-14 RX ORDER — SODIUM CHLORIDE, SODIUM LACTATE, POTASSIUM CHLORIDE, CALCIUM CHLORIDE 600; 310; 30; 20 MG/100ML; MG/100ML; MG/100ML; MG/100ML
INJECTION, SOLUTION INTRAVENOUS CONTINUOUS PRN
Status: DISCONTINUED | OUTPATIENT
Start: 2022-07-14 | End: 2022-07-14

## 2022-07-14 RX ORDER — PROPOFOL 10 MG/ML
INJECTION, EMULSION INTRAVENOUS AS NEEDED
Status: DISCONTINUED | OUTPATIENT
Start: 2022-07-14 | End: 2022-07-14

## 2022-07-14 RX ORDER — FENTANYL CITRATE 50 UG/ML
INJECTION, SOLUTION INTRAMUSCULAR; INTRAVENOUS AS NEEDED
Status: DISCONTINUED | OUTPATIENT
Start: 2022-07-14 | End: 2022-07-14

## 2022-07-14 RX ORDER — PROPOFOL 10 MG/ML
INJECTION, EMULSION INTRAVENOUS CONTINUOUS PRN
Status: DISCONTINUED | OUTPATIENT
Start: 2022-07-14 | End: 2022-07-14

## 2022-07-14 RX ADMIN — PROPOFOL 100 MG: 10 INJECTION, EMULSION INTRAVENOUS at 09:41

## 2022-07-14 RX ADMIN — LIDOCAINE HYDROCHLORIDE 100 MG: 10 INJECTION, SOLUTION EPIDURAL; INFILTRATION; INTRACAUDAL; PERINEURAL at 09:41

## 2022-07-14 RX ADMIN — SODIUM CHLORIDE, SODIUM LACTATE, POTASSIUM CHLORIDE, AND CALCIUM CHLORIDE: .6; .31; .03; .02 INJECTION, SOLUTION INTRAVENOUS at 09:28

## 2022-07-14 RX ADMIN — PROPOFOL 20 MG: 10 INJECTION, EMULSION INTRAVENOUS at 09:45

## 2022-07-14 RX ADMIN — PROPOFOL 170 MCG/KG/MIN: 10 INJECTION, EMULSION INTRAVENOUS at 09:41

## 2022-07-14 RX ADMIN — FENTANYL CITRATE 50 MCG: 50 INJECTION INTRAMUSCULAR; INTRAVENOUS at 09:49

## 2022-07-14 RX ADMIN — PROPOFOL 50 MG: 10 INJECTION, EMULSION INTRAVENOUS at 09:51

## 2022-07-14 NOTE — ANESTHESIA POSTPROCEDURE EVALUATION
Post-Op Assessment Note    CV Status:  Stable  Pain Score: 0    Pain management: adequate     Mental Status:  Alert and awake   Hydration Status:  Euvolemic   PONV Controlled:  Controlled   Airway Patency:  Patent      Post Op Vitals Reviewed: Yes      Staff: CRNA         No complications documented      BP   158/76   Temp 98 1   Pulse 91   Resp 18   SpO2 94%

## 2022-07-14 NOTE — H&P
History and Physical - [facility]  [facility] 65 y o  female MRN: [id number]        HPI:  25-year-old female being evaluated for bariatric surgery  Regular bowel movements  Historical Information   Past Medical History:   Diagnosis Date    Dental crown present     History of motion sickness     History of pneumonia     Left foot pain     "bone spur"    Right shoulder pain     and right rotator cuff problem    Stress     situational    Thalassemia     Wears contact lenses     Wears glasses      Past Surgical History:   Procedure Laterality Date    APPENDECTOMY      BREAST SURGERY Left     lump removed noncancer    MOUTH SURGERY      [place] HALLUX RIGIDUS W/CHEILECTOMY 1ST MP JT W/IMPLT Left 2019    Procedure: 1ST MPJ TOTAL JOINT IMPLANT;  Surgeon: Janes Hazel DPM;  Location: [facility];  Service: Podiatry    ROTATOR CUFF REPAIR Left     ROTATOR CUFF REPAIR Right     "eventually needs replacement"     Social History   Social History     Substance and Sexual Activity   Alcohol Use Yes    Comment: socially     Social History     Substance and Sexual Activity   Drug Use Never     Social History     Tobacco Use   Smoking Status Former Smoker    Years: 33 00    Types: Cigarettes    Quit date:     Years since quittin 5   Smokeless Tobacco Never Used   Tobacco Comment    quit in 2016      Family History   Problem Relation Age of Onset    Thyroid disease Mother         parathyroid removed    Heart disease Father     Hodgkin's lymphoma Sister        Meds/Allergies     (Not in a hospital admission)      Allergies   Allergen Reactions    Erythromycin Vomiting     "projectile vomiting"    Morphine Headache     Category: Adverse Reaction;        Objective     There were no vitals taken for this visit      PHYSICAL EXAM:    Gen: NAD  CV: S1 & S2 normal, RRR  CHEST: Clear to auscultate  ABD: soft, NT/ND, good bowel sounds  EXT: no edema    ASSESSMENT:     Preop for bariatric surgery, screening for colon cancer    PLAN:    EGD and colonoscopy

## 2022-07-14 NOTE — ANESTHESIA PREPROCEDURE EVALUATION
Procedure:  COLONOSCOPY  EGD    Relevant Problems   CARDIO   (+) Hyperlipidemia      Morbid obesity (BMI 42)  Physical Exam    Airway    Mallampati score: II  TM Distance: >3 FB  Neck ROM: full     Dental       Cardiovascular      Pulmonary      Other Findings        Anesthesia Plan  ASA Score- 3     Anesthesia Type- IV sedation with anesthesia with ASA Monitors  Additional Monitors:   Airway Plan:           Plan Factors-Exercise tolerance (METS): >4 METS  Chart reviewed  Patient summary reviewed  Patient is not a current smoker  Induction- intravenous  Postoperative Plan-     Informed Consent- Anesthetic plan and risks discussed with patient  I personally reviewed this patient with the CRNA  Discussed and agreed on the Anesthesia Plan with the GERALD Rhodes

## 2022-07-25 ENCOUNTER — TELEPHONE (OUTPATIENT)
Dept: BARIATRICS | Facility: CLINIC | Age: 57
End: 2022-07-25

## 2022-07-25 NOTE — TELEPHONE ENCOUNTER
Called pt and left voicemail in regards to her missed appt with RD on 7/14/22  Pt was supposed to come in for her appt later in the day after her EGD/colonoscopy since she was already off of work, as per pt's request  Pt did not show up for appt and has yet to call back to reschedule  Left a message advising pt that she needs to call to reschedule the appt since she hasn't been in the office since her surgeon appt in on April 29th  Advised her if we do not hear from her by Friday, July 29th we are to assume she does not want to continue with bariatric surgery and will be halted  Requested a call back

## 2022-08-01 ENCOUNTER — OFFICE VISIT (OUTPATIENT)
Dept: CARDIOLOGY CLINIC | Facility: CLINIC | Age: 57
End: 2022-08-01
Payer: COMMERCIAL

## 2022-08-01 VITALS
WEIGHT: 268 LBS | TEMPERATURE: 98.7 F | HEIGHT: 65 IN | SYSTOLIC BLOOD PRESSURE: 138 MMHG | HEART RATE: 86 BPM | BODY MASS INDEX: 44.65 KG/M2 | DIASTOLIC BLOOD PRESSURE: 82 MMHG | OXYGEN SATURATION: 98 %

## 2022-08-01 DIAGNOSIS — R03.0 ELEVATED BP WITHOUT DIAGNOSIS OF HYPERTENSION: ICD-10-CM

## 2022-08-01 DIAGNOSIS — E78.2 MIXED HYPERLIPIDEMIA: ICD-10-CM

## 2022-08-01 DIAGNOSIS — Z01.810 PREOPERATIVE CARDIOVASCULAR EXAMINATION: Primary | ICD-10-CM

## 2022-08-01 DIAGNOSIS — E66.01 OBESITY, CLASS III, BMI 40-49.9 (MORBID OBESITY) (HCC): ICD-10-CM

## 2022-08-01 PROCEDURE — 99214 OFFICE O/P EST MOD 30 MIN: CPT | Performed by: INTERNAL MEDICINE

## 2022-08-01 PROCEDURE — 93000 ELECTROCARDIOGRAM COMPLETE: CPT | Performed by: INTERNAL MEDICINE

## 2022-08-01 NOTE — LETTER
2022     [address] [place] [address]    Patient: Timothy Wallace   YOB: 1965   Date of Visit: 2022       Dear Dr Miles Sevilla: Thank you for referring Mauricio Vargas to me for evaluation  Below are my notes for this consultation  If you have questions, please do not hesitate to call me  I look forward to following your patient along with you  Sincerely,        Cheryle Ventura MD        CC: MD Cheryle Pedro MD  2022  4:21 PM  Incomplete  [facility] CARDIOLOGY Ashley Cathy  [address] [place] [id number]  Phone#  [phone]  Fax#  [phone]  [facility] Cardiology Office Consultation             NAME: Timothy Wallace  AGE: 65 y o  SEX: female   : 1965   MRN: [id number]    DATE: 2022  TIME: 4:20 PM    Assessment and plan:    Preoperative cardiovascular examination  Revised cardiac risk index: "high-risk" surgery (intraperitoneal)  RCI RISK CLASS II (1 risk factor, risk of major cardiac compl  appr  1 3%)  Cardiac risk factors include dyslipidemia, obesity (BMI >= 30 kg/m2), sedentary lifestyle, family history and smoking/ tobacco exposure  Functional capacity exceeds 4 metabolic equivalents  No current cardiac symptoms  No significant EKG abnormality except mild left axis deviation  No obvious Cardiac Contraindication for Planned Surgical Procedures  Exercise treadmill stress test planned to objectively assess functional capacity and hypertension control  Hyperlipidemia  Last lipid profile from  reviewed  Total cholesterol 246, , HDL 54  Last hemoglobin A1c was 6 3  Current ASCVD risk below 5%  Repeat labs are due this year  Obesity, Class III, BMI 40-49 9 (morbid obesity) ([address] )  Bariatric surgery planned  She will start regular exercise on the treadmill    Continue efforts at weight loss including diet modification, increased physical activity and avoidance of calorie dense foods     Mediterranean style plant based diet and calorie restriction will be beneficial           Elevated BP without diagnosis of hypertension  BP Readings from Last 3 Encounters:   08/01/22 138/82   07/14/22 164/67   06/21/22 130/78     Blood pressure elevated during recent EGD  Previous blood pressures have been normal   Continue home blood pressure monitoring  Continue current medications  Lifestyle modification measures to help blood pressure control include:increased physical activity, low-salt diet rich in fruits and vegetables, avoidance of alcohol intake and maintaining healthy weight  Weight loss with bariatric surgery will help blood pressure control  Chief Complaint   Patient presents with   174 [facility] Patient Visit       HPI:      Ty Schumacher is a 65 y o  female who has been referred here for assessment of preoperative cardiovascular risk prior to upcoming surgery  Preoperative consultation before planned bariatric surgery by Dr Darren Young  She has a history of hyperlipidemia, prior tobacco use, family history of premature CAD  Current cardiac symptoms: No chest pain, No shortness of breath, No edema, No palpitations    Activity levels are described to be > 4 METS (able to walk 4 blocks or climb 2 flights of steps)  Can walk up to a mile and can climb 2 flights of stairs  Revised cardiac risk index: "high-risk" surgery (intraperitoneal, intrathoracic, or suprainguinal vascular)    RCI RISK CLASS II (1 risk factor, risk of major cardiac compl  appr  1 3%)    Cardiac risk factors include dyslipidemia, obesity (BMI >= 30 kg/m2), sedentary lifestyle and smoking/ tobacco exposure  No Cardiac Contraindication for Planned Surgical Procedures    Last lipid profile from 05/21 reviewed  Total cholesterol 246, , HDL 54  Last hemoglobin A1c was 6 3  Recent EGD showed mild gastritis and hiatal hernia    Historically blood pressures have been controlled but on the day of her endoscopy, systolic blood pressure was 164  Ten year ASCVD risk currently is less than 5%  She has never had calcium scoring performed  Current symptoms:     Cardiology Problem list:  8/22: Ten year ASCVD risk 4 6%  Dyslipidemia  Pre hypertension  Obesity:  BMI 44  Gastritis and hiatal hernia:  7/22    Past history, family history, social history, current medications, vital signs, recent lab and imaging studies and  prior cardiology studies reviewed independently on this visit  BP Readings from Last 4 Encounters:   08/01/22 138/82   07/14/22 164/67   06/21/22 130/78   04/29/22 130/86      Wt Readings from Last 3 Encounters:   08/01/22 122 kg (268 lb)   07/14/22 120 kg (265 lb)   06/21/22 113 kg (249 lb 9 6 oz)         ALLERGIES:  Allergies   Allergen Reactions    Erythromycin Vomiting     "projectile vomiting"    Morphine Headache     Category: Adverse Reaction;          Current Outpatient Medications:     famotidine (PEPCID) 20 mg tablet, Take 1 tablet (20 mg total) by mouth 2 (two) times a day, Disp: 60 tablet, Rfl: 11    Ibuprofen 200 MG CAPS, Take by mouth as needed, Disp: , Rfl:     Multiple Vitamin (Multivitamin Adult) TABS, Take 1 tablet by mouth daily, Disp: , Rfl:     Cholecalciferol 25 MCG (1000 UT) capsule, Take by mouth daily (Patient not taking: Reported on 8/1/2022), Disp: , Rfl:       Review of Systems   Constitutional: Positive for fatigue and unexpected weight change  HENT: Negative  Eyes: Negative  Respiratory: Negative for cough and shortness of breath  Cardiovascular: Negative for chest pain and palpitations  Gastrointestinal: Negative  Endocrine: Negative  Genitourinary: Negative  Musculoskeletal: Positive for arthralgias  Skin: Negative  Allergic/Immunologic: Negative  Neurological: Negative  Hematological: Does not bruise/bleed easily  Psychiatric/Behavioral: Negative          Past Medical History:   Diagnosis Date    Dental crown present  History of motion sickness     History of pneumonia     Left foot pain     "bone spur"    Right shoulder pain     and right rotator cuff problem    Stress     situational    Thalassemia     Wears contact lenses     Wears glasses        Past Surgical History:   Procedure Laterality Date    APPENDECTOMY      BREAST SURGERY Left     lump removed noncancer    MOUTH SURGERY      MA HALLUX RIGIDUS W/CHEILECTOMY 1ST MP JT W/IMPLT Left 8/28/2019    Procedure: 1ST MPJ TOTAL JOINT IMPLANT;  Surgeon: Nya Sunshine DPM;  Location: [facility];  Service: Podiatry    ROTATOR CUFF REPAIR Left     ROTATOR CUFF REPAIR Right     "eventually needs replacement"       Family History   Problem Relation Age of Onset    Thyroid disease Mother         parathyroid removed    Heart disease Father     Hodgkin's lymphoma Sister        Social History   reports that she quit smoking about 6 years ago  Her smoking use included cigarettes  She quit after 33 00 years of use  She has never used smokeless tobacco  She reports current alcohol use  She reports that she does not use drugs  Objective:     Vitals:    08/01/22 1548   BP: 138/82   Pulse: 86   Temp: 98 7 °F (37 1 °C)   SpO2: 98%     Wt Readings from Last 3 Encounters:   08/01/22 122 kg (268 lb)   07/14/22 120 kg (265 lb)   06/21/22 113 kg (249 lb 9 6 oz)     Pulse Readings from Last 3 Encounters:   08/01/22 86   07/14/22 92   06/21/22 83     BP Readings from Last 3 Encounters:   08/01/22 138/82   07/14/22 164/67   06/21/22 130/78         Physical Exam  Vitals reviewed  Constitutional:       General: She is not in acute distress  HENT:      Head: Normocephalic  Right Ear: External ear normal       Left Ear: External ear normal    Eyes:      General: No scleral icterus  Neck:      Vascular: No carotid bruit  Cardiovascular:      Rate and Rhythm: Normal rate and regular rhythm  Heart sounds: S1 normal and S2 normal  No murmur heard    Pulmonary: Breath sounds: Normal breath sounds  No wheezing or rhonchi  Musculoskeletal:      Right lower leg: No edema  Left lower leg: No edema  Skin:     General: Skin is warm  Findings: No lesion  Neurological:      General: No focal deficit present  Mental Status: She is alert  Psychiatric:         Mood and Affect: Mood normal          Pertinent Laboratory/Diagnostic Studies:    Laboratory studies reviewed personally by Marty Bustamante MD    Lipids as noted above    Imaging Studies:   EGD    Result Date: 7/14/2022    Impression: Esophagus-normal mucosa  1-2 cm sliding hiatal hernia was noted  Stomach-mild patchy erythema in the antrum  Biopsies done to check for H pylori  The duodenum appeared normal  RECOMMENDATION: Await pathology results Pepcid twice daily   Mariely Zelaya MD     Colonoscopy    Result Date: 7/14/2022    Impression: One polyp measuring 5-9 mm in the cecum; removed by cold snare Two polyps measuring smaller than 5 mm in the ascending colon; performed cold forceps biopsy Few diverticula in the sigmoid colon RECOMMENDATION: Repeat colonoscopy in 3 years due to a personal history of colon polyps  Mariely Zelaya MD           Cardiac testing:   EKG reviewed personally: normal EKG, normal sinus rhythm, left axis deviation  Orders Placed This Encounter   Procedures    Stress test only, exercise    POCT ECG           Corky Christopher MD, [facility]    Portions of the record may have been created with voice recognition software  Occasional wrong word or "sound a like" substitutions may have occurred due to the inherent limitations of voice recognition software  Read the chart carefully and recognize, using context, where substitutions have occurred  If you have any questions or concerns about the context, text or information contained within the body of this dictation, please contact myself, the provider, for further clarification        Marty Bustamante MD  8/1/2022  4:16 PM  Sign when Signing Visit  [facility] [address] [place] [facility] [address]  Phone#  [phone]  Fax#  [phone]  [facility] Cardiology Office Consultation             NAME: Memo Macario  AGE: 65 y o  SEX: female   : 1965   MRN: [id number]    DATE: 2022  TIME: 4:16 PM    Assessment and plan:    Hyperlipidemia  Last lipid profile from  reviewed  Total cholesterol 246, , HDL 54  Last hemoglobin A1c was 6 3  Obesity, Class III, BMI 40-49 9 (morbid obesity) ([address] )    Continue efforts at weight loss including diet modification, increased physical activity and avoidance of calorie dense foods  Mediterranean style plant based diet and calorie restriction will be beneficial           Elevated BP without diagnosis of hypertension  BP Readings from Last 3 Encounters:   22 138/82   22 164/67   22 130/78     Blood pressure elevated during recent EGD  Previous blood pressures have been normal   Continue home blood pressure monitoring  Continue current medications  Lifestyle modification measures to help blood pressure control include:increased physical activity, low-salt diet rich in fruits and vegetables, avoidance of alcohol intake and maintaining healthy weight  Preoperative cardiovascular examination  Revised cardiac risk index: "high-risk" surgery (intraperitoneal)  RCI RISK CLASS II (1 risk factor, risk of major cardiac compl  appr  1 3%)  Cardiac risk factors include dyslipidemia, obesity (BMI >= 30 kg/m2), sedentary lifestyle and smoking/ tobacco exposure  Functional capacity exceeds 4 metabolic equivalents  No current cardiac symptoms  No significant EKG abnormality  No Cardiac Contraindication for Planned Surgical Procedures           Chief Complaint   Patient presents with   174 [facility] Patient Visit       HPI:    Memo Macario is a 65y o -year-old female who presents to the cardiology clinic for evaluation  Memo Macario is a 65 y o  female who has been referred here for assessment of preoperative cardiovascular risk prior to upcoming surgery  Preoperative consultation before planned bariatric surgery  She has a history of hyperlipidemia, prior tobacco use, family history of premature CAD  Current cardiac symptoms: No chest pain, No shortness of breath, No edema, No palpitations    Activity levels are described to be > 4 METS (able to walk 4 blocks or climb 2 flights of steps)  Can walk up to a mile and can climb 2 flights of stairs  Revised cardiac risk index: "high-risk" surgery (intraperitoneal, intrathoracic, or suprainguinal vascular)    RCI RISK CLASS II (1 risk factor, risk of major cardiac compl  appr  1 3%)    Cardiac risk factors include dyslipidemia, obesity (BMI >= 30 kg/m2), sedentary lifestyle and smoking/ tobacco exposure  No Cardiac Contraindication for Planned Surgical Procedures    Last lipid profile from 05/21 reviewed  Total cholesterol 246, , HDL 54  Last hemoglobin A1c was 6 3  Recent EGD showed mild gastritis and hiatal hernia  Historically blood pressures have been controlled but on the day of her endoscopy, systolic blood pressure was 164  Ten year ASCVD risk currently is less than 5%  She has never had calcium scoring performed  Current symptoms:     Cardiology Problem list:  8/22: Ten year ASCVD risk 4 6%  Dyslipidemia  Pre hypertension  Obesity:  BMI 44  Gastritis and hiatal hernia:  7/22    Past history, family history, social history, current medications, vital signs, recent lab and imaging studies and  prior cardiology studies reviewed independently on this visit      BP Readings from Last 4 Encounters:   08/01/22 138/82   07/14/22 164/67   06/21/22 130/78   04/29/22 130/86      Wt Readings from Last 3 Encounters:   08/01/22 122 kg (268 lb)   07/14/22 120 kg (265 lb)   06/21/22 113 kg (249 lb 9 6 oz)         ALLERGIES:  Allergies   Allergen Reactions    Erythromycin Vomiting "projectile vomiting"    Morphine Headache     Category: Adverse Reaction;          Current Outpatient Medications:     famotidine (PEPCID) 20 mg tablet, Take 1 tablet (20 mg total) by mouth 2 (two) times a day, Disp: 60 tablet, Rfl: 11    Ibuprofen 200 MG CAPS, Take by mouth as needed, Disp: , Rfl:     Multiple Vitamin (Multivitamin Adult) TABS, Take 1 tablet by mouth daily, Disp: , Rfl:     Cholecalciferol 25 MCG (1000 UT) capsule, Take by mouth daily (Patient not taking: Reported on 8/1/2022), Disp: , Rfl:       Review of Systems   Constitutional: Negative  HENT: Negative  Eyes: Negative  Respiratory: Negative for cough and shortness of breath  Cardiovascular: Negative for chest pain and palpitations  Gastrointestinal: Negative  Endocrine: Negative  Genitourinary: Negative  Musculoskeletal: Negative  Skin: Negative  Allergic/Immunologic: Negative  Neurological: Negative  Hematological: Does not bruise/bleed easily  Psychiatric/Behavioral: Negative          Past Medical History:   Diagnosis Date    Dental crown present     History of motion sickness     History of pneumonia     Left foot pain     "bone spur"    Right shoulder pain     and right rotator cuff problem    Stress     situational    Thalassemia     Wears contact lenses     Wears glasses        Past Surgical History:   Procedure Laterality Date    APPENDECTOMY      BREAST SURGERY Left     lump removed noncancer    MOUTH SURGERY      CT HALLUX RIGIDUS W/CHEILECTOMY 1ST MP JT W/IMPLT Left 8/28/2019    Procedure: 1ST MPJ TOTAL JOINT IMPLANT;  Surgeon: Melyssa Cardona DPM;  [facility];  Service: Podiatry    ROTATOR CUFF REPAIR Left     ROTATOR CUFF REPAIR Right     "eventually needs replacement"       Family History   Problem Relation Age of Onset    Thyroid disease Mother         parathyroid removed    Heart disease Father     Hodgkin's lymphoma Sister        Social History   reports that she quit smoking about 6 years ago  Her smoking use included cigarettes  She quit after 33 00 years of use  She has never used smokeless tobacco  She reports current alcohol use  She reports that she does not use drugs  Objective:     Vitals:    08/01/22 1548   BP: 138/82   Pulse: 86   Temp: 98 7 °F (37 1 °C)   SpO2: 98%     Wt Readings from Last 3 Encounters:   08/01/22 122 kg (268 lb)   07/14/22 120 kg (265 lb)   06/21/22 113 kg (249 lb 9 6 oz)     Pulse Readings from Last 3 Encounters:   08/01/22 86   07/14/22 92   06/21/22 83     BP Readings from Last 3 Encounters:   08/01/22 138/82   07/14/22 164/67   06/21/22 130/78         Physical Exam  Vitals reviewed  Constitutional:       General: She is not in acute distress  HENT:      Head: Normocephalic  Right Ear: External ear normal       Left Ear: External ear normal    Eyes:      General: No scleral icterus  Neck:      Vascular: No carotid bruit  Cardiovascular:      Rate and Rhythm: Normal rate and regular rhythm  Heart sounds: S1 normal and S2 normal  No murmur heard  Pulmonary:      Breath sounds: Normal breath sounds  No wheezing or rhonchi  Musculoskeletal:      Right lower leg: No edema  Left lower leg: No edema  Skin:     General: Skin is warm  Findings: No lesion  Neurological:      General: No focal deficit present  Mental Status: She is alert  Psychiatric:         Mood and Affect: Mood normal          Pertinent Laboratory/Diagnostic Studies:    Laboratory studies reviewed personally by Jerald Flores MD    Lipids as noted above    Imaging Studies:   EGD    Result Date: 7/14/2022    Impression: Esophagus-normal mucosa  1-2 cm sliding hiatal hernia was noted  Stomach-mild patchy erythema in the antrum  Biopsies done to check for H pylori   The duodenum appeared normal  RECOMMENDATION: Await pathology results Pepcid twice daily   Elena Gamino MD     Colonoscopy    Result Date: 7/14/2022    Impression: One polyp measuring 5-9 mm in the cecum; removed by cold snare Two polyps measuring smaller than 5 mm in the ascending colon; performed cold forceps biopsy Few diverticula in the sigmoid colon RECOMMENDATION: Repeat colonoscopy in 3 years due to a personal history of colon polyps  Kelsi Sierra MD           Cardiac testing:   EKG reviewed personally: normal EKG, normal sinus rhythm, left axis deviation  Orders Placed This Encounter   Procedures    Stress test only, exercise    POCT ECG           Milena Parks MD, [facility]    Portions of the record may have been created with voice recognition software  Occasional wrong word or "sound a like" substitutions may have occurred due to the inherent limitations of voice recognition software  Read the chart carefully and recognize, using context, where substitutions have occurred  If you have any questions or concerns about the context, text or information contained within the body of this dictation, please contact myself, the provider, for further clarification

## 2022-08-01 NOTE — ASSESSMENT & PLAN NOTE
Bariatric surgery planned  She will start regular exercise on the treadmill  Continue efforts at weight loss including diet modification, increased physical activity and avoidance of calorie dense foods      Mediterranean style plant based diet and calorie restriction will be beneficial

## 2022-08-01 NOTE — ASSESSMENT & PLAN NOTE
Last lipid profile from 05/21 reviewed  Total cholesterol 246, , HDL 54  Last hemoglobin A1c was 6 3  Current ASCVD risk below 5%  Repeat labs are due this year

## 2022-08-01 NOTE — ASSESSMENT & PLAN NOTE
Revised cardiac risk index: "high-risk" surgery (intraperitoneal)  RCI RISK CLASS II (1 risk factor, risk of major cardiac compl  appr  1 3%)  Cardiac risk factors include dyslipidemia, obesity (BMI >= 30 kg/m2), sedentary lifestyle, family history and smoking/ tobacco exposure  Functional capacity exceeds 4 metabolic equivalents  No current cardiac symptoms  No significant EKG abnormality except mild left axis deviation  No obvious Cardiac Contraindication for Planned Surgical Procedures  Exercise treadmill stress test planned to objectively assess functional capacity and hypertension control

## 2022-08-01 NOTE — PROGRESS NOTES
[facility] [address] [place]  [place] [address]  Phone#  [phone]  Fax#  [phone]  [facility] Consultation             NAME: Timothy Wallace  AGE: 65 y o  SEX: female   : 1965   MRN: [id number]    DATE: 2022  TIME: 4:24 PM    Assessment and plan:    Preoperative cardiovascular examination  Revised cardiac risk index: "high-risk" surgery (intraperitoneal)  RCI RISK CLASS II (1 risk factor, risk of major cardiac compl  appr  1 3%)  Cardiac risk factors include dyslipidemia, obesity (BMI >= 30 kg/m2), sedentary lifestyle, family history and smoking/ tobacco exposure  Functional capacity exceeds 4 metabolic equivalents  No current cardiac symptoms  No significant EKG abnormality except mild left axis deviation  No obvious Cardiac Contraindication for Planned Surgical Procedures  Exercise treadmill stress test planned to objectively assess functional capacity and hypertension control  Hyperlipidemia  Last lipid profile from  reviewed  Total cholesterol 246, , HDL 54  Last hemoglobin A1c was 6 3  Current ASCVD risk below 5%  Repeat labs are due this year  Obesity, Class III, BMI 40-49 9 (morbid obesity) ([address] )  Bariatric surgery planned  She will start regular exercise on the treadmill  Continue efforts at weight loss including diet modification, increased physical activity and avoidance of calorie dense foods  Mediterranean style plant based diet and calorie restriction will be beneficial           Elevated BP without diagnosis of hypertension  BP Readings from Last 3 Encounters:   22 138/82   22 164/67   22 130/78     Blood pressure elevated during recent EGD  Previous blood pressures have been normal   Continue home blood pressure monitoring  Continue current medications    Lifestyle modification measures to help blood pressure control include:increased physical activity, low-salt diet rich in fruits and vegetables, avoidance of alcohol intake and maintaining healthy weight  Weight loss with bariatric surgery will help blood pressure control  Chief Complaint   Patient presents with   [facility] Patient Visit       HPI:      Fredo Littlejohn is a 65 y o  female who has been referred here for assessment of preoperative cardiovascular risk prior to upcoming surgery  Preoperative consultation before planned bariatric surgery by Dr Bennett Cross  She has a history of hyperlipidemia, prior tobacco use, family history of premature CAD  Current cardiac symptoms: No chest pain, No shortness of breath, No edema, No palpitations  No previous cardiac evaluation  Activity levels are described to be > 4 METS (able to walk 4 blocks or climb 2 flights of steps)  Can walk up to a half a mile and can climb 2 flights of stairs  However reports a sedentary lifestyle  She does have a treadmill at home and also a exercise bike but does not use it  She is a former smoker  She reports that her father had premature CAD  Revised cardiac risk index: "high-risk" surgery (intraperitoneal, intrathoracic, or suprainguinal vascular)    RCI RISK CLASS II (1 risk factor, risk of major cardiac compl  appr  1 3%)    Cardiac risk factors include dyslipidemia, obesity (BMI >= 30 kg/m2), sedentary lifestyle and smoking/ tobacco exposure  No Cardiac Contraindication for Planned Surgical Procedures    Last lipid profile from 05/21 reviewed  Total cholesterol 246, , HDL 54  Last hemoglobin A1c was 6 3  Recent EGD showed mild gastritis and hiatal hernia  Historically blood pressures have been controlled but on the day of her endoscopy, systolic blood pressure was 164  Ten year ASCVD risk currently is less than 5%  She has never had calcium scoring performed      I have planned further risk stratification with exercise treadmill stress test mainly to assess functional capacity and blood pressure response to exercise  If this shows acceptable functional capacity and no ischemia at low workload, it should be safe to proceed with bariatric surgery  Cardiology Problem list:  8/22: Ten year ASCVD risk 4 6%  Dyslipidemia  Pre hypertension  Obesity:  BMI 44  Gastritis and hiatal hernia:  7/22    Past history, family history, social history, current medications, vital signs, recent lab and imaging studies and  prior cardiology studies reviewed independently on this visit  BP Readings from Last 4 Encounters:   08/01/22 138/82   07/14/22 164/67   06/21/22 130/78   04/29/22 130/86      Wt Readings from Last 3 Encounters:   08/01/22 122 kg (268 lb)   07/14/22 120 kg (265 lb)   06/21/22 113 kg (249 lb 9 6 oz)         ALLERGIES:  Allergies   Allergen Reactions    Erythromycin Vomiting     "projectile vomiting"    Morphine Headache     Category: Adverse Reaction;          Current Outpatient Medications:     famotidine (PEPCID) 20 mg tablet, Take 1 tablet (20 mg total) by mouth 2 (two) times a day, Disp: 60 tablet, Rfl: 11    Ibuprofen 200 MG CAPS, Take by mouth as needed, Disp: , Rfl:     Multiple Vitamin (Multivitamin Adult) TABS, Take 1 tablet by mouth daily, Disp: , Rfl:     Cholecalciferol 25 MCG (1000 UT) capsule, Take by mouth daily (Patient not taking: Reported on 8/1/2022), Disp: , Rfl:       Review of Systems   Constitutional: Positive for fatigue and unexpected weight change  HENT: Negative  Eyes: Negative  Respiratory: Negative for cough and shortness of breath  Cardiovascular: Negative for chest pain and palpitations  Gastrointestinal: Negative  Endocrine: Negative  Genitourinary: Negative  Musculoskeletal: Positive for arthralgias  Skin: Negative  Allergic/Immunologic: Negative  Neurological: Negative  Hematological: Does not bruise/bleed easily  Psychiatric/Behavioral: Negative          Past Medical History:   Diagnosis Date    Dental crown present     History of motion sickness     History of pneumonia     Left foot pain     "bone spur"    Right shoulder pain     and right rotator cuff problem    Stress     situational    Thalassemia     Wears contact lenses     Wears glasses        Past Surgical History:   Procedure Laterality Date    APPENDECTOMY      BREAST SURGERY Left     lump removed noncancer    MOUTH SURGERY      UT HALLUX RIGIDUS W/CHEILECTOMY 1ST MP JT W/IMPLT Left 8/28/2019    Procedure: 1ST MPJ TOTAL JOINT IMPLANT;  Surgeon: Janes Hazel DPM;  Location: [facility];  Service: Podiatry    ROTATOR CUFF REPAIR Left     ROTATOR CUFF REPAIR Right     "eventually needs replacement"       Family History   Problem Relation Age of Onset    Thyroid disease Mother         parathyroid removed    Heart disease Father     Hodgkin's lymphoma Sister        Social History   reports that she quit smoking about 6 years ago  Her smoking use included cigarettes  She quit after 33 00 years of use  She has never used smokeless tobacco  She reports current alcohol use  She reports that she does not use drugs  Objective:     Vitals:    08/01/22 1548   BP: 138/82   Pulse: 86   Temp: 98 7 °F (37 1 °C)   SpO2: 98%     Wt Readings from Last 3 Encounters:   08/01/22 122 kg (268 lb)   07/14/22 120 kg (265 lb)   06/21/22 113 kg (249 lb 9 6 oz)     Pulse Readings from Last 3 Encounters:   08/01/22 86   07/14/22 92   06/21/22 83     BP Readings from Last 3 Encounters:   08/01/22 138/82   07/14/22 164/67   06/21/22 130/78         Physical Exam  Vitals reviewed  Constitutional:       General: She is not in acute distress  HENT:      Head: Normocephalic  Right Ear: External ear normal       Left Ear: External ear normal    Eyes:      General: No scleral icterus  Neck:      Vascular: No carotid bruit  Cardiovascular:      Rate and Rhythm: Normal rate and regular rhythm  Heart sounds: S1 normal and S2 normal  No murmur heard    Pulmonary:      Breath sounds: Normal breath sounds  No wheezing or rhonchi  Musculoskeletal:      Right lower leg: No edema  Left lower leg: No edema  Skin:     General: Skin is warm  Findings: No lesion  Neurological:      General: No focal deficit present  Mental Status: She is alert  Psychiatric:         Mood and Affect: Mood normal          Pertinent Laboratory/Diagnostic Studies:    Laboratory studies reviewed personally by Russell Wilson MD    Lipids as noted above    Imaging Studies:   EGD    Result Date: 7/14/2022    Impression: Esophagus-normal mucosa  1-2 cm sliding hiatal hernia was noted  Stomach-mild patchy erythema in the antrum  Biopsies done to check for H pylori  The duodenum appeared normal  RECOMMENDATION: Await pathology results Pepcid twice daily   Zac Flaherty MD     Colonoscopy    Result Date: 7/14/2022    Impression: One polyp measuring 5-9 mm in the cecum; removed by cold snare Two polyps measuring smaller than 5 mm in the ascending colon; performed cold forceps biopsy Few diverticula in the sigmoid colon RECOMMENDATION: Repeat colonoscopy in 3 years due to a personal history of colon polyps  Zac Flaherty MD           Cardiac testing:   EKG reviewed personally: normal EKG, normal sinus rhythm, left axis deviation  Orders Placed This Encounter   Procedures    Stress test only, exercise    POCT ECG           Good Wagner MD, [facility]    Portions of the record may have been created with voice recognition software  Occasional wrong word or "sound a like" substitutions may have occurred due to the inherent limitations of voice recognition software  Read the chart carefully and recognize, using context, where substitutions have occurred  If you have any questions or concerns about the context, text or information contained within the body of this dictation, please contact myself, the provider, for further clarification

## 2022-08-01 NOTE — ASSESSMENT & PLAN NOTE
BP Readings from Last 3 Encounters:   08/01/22 138/82   07/14/22 164/67   06/21/22 130/78     Blood pressure elevated during recent EGD  Previous blood pressures have been normal   Continue home blood pressure monitoring  Continue current medications  Lifestyle modification measures to help blood pressure control include:increased physical activity, low-salt diet rich in fruits and vegetables, avoidance of alcohol intake and maintaining healthy weight  Weight loss with bariatric surgery will help blood pressure control

## 2022-08-01 NOTE — PATIENT INSTRUCTIONS
Stress testing planned for further cardiac risk assessment  Continue efforts at weight loss including diet modification, increased physical activity and avoidance of calorie dense foods  Mediterranean style plant based diet and calorie restriction will be beneficial         MEDITERRANEANDIET    Mediterranean diet: A heart-healthy eating plan    What is the Mediterranean diet? The Mediterranean diet is a way of eating based on the traditional cuisine of countries bordering [facility]  While there is no single definition of the Mediterranean diet, it is typically high in vegetables, fruits, whole grains, beans, nut and seeds, and olive oil  The main components of Mediterranean diet include:    Daily consumption of vegetables, fruits, whole grains and healthy fats  Weekly intake of fish, poultry, beans and eggs  Moderate portions of dairy products  Limited intake of red meat  Other important elements of the Mediterranean diet are sharing meals with family and friends    Plant based, not meat based  The foundation of the Mediterranean diet is vegetables, fruits, herbs, nuts, beans and whole grains  Meals are built around these plant-based foods  Small amounts of dairy, poultry and eggs are also central to the Mediterranean Diet, as is seafood  In contrast, red meat is eaten only occasionally  Healthy fats  Healthy fats are a mainstay of the Mediterranean diet  They're eaten instead of less healthy fats, such as saturated and trans fats, which contribute to heart disease  Olive oil is the primary source of added fat in the Mediterranean diet  Olive oil provides monounsaturated fat, which has been found to lower total cholesterol and low-density lipoprotein (LDL or "bad") cholesterol levels  Nuts and seeds also contain monounsaturated fat  Fish are also important in the [address]   Fatty fish -- such as mackerel, herring, sardines, albacore tuna, salmon and lake trout -- are rich in omega-3 fatty acids, a type of polyunsaturated fat that may reduce inflammation in the body  Omega-3 fatty acids also help decrease triglycerides, reduce blood clotting, and decrease the risk of stroke and heart failure  Eating the [address]  Interested in trying the [address]? These tips will help you get started:    Eat more fruits and vegetables  Aim for 7 to 10 servings a day of fruit and vegetables  Opt for whole grains  Switch to whole-grain bread, cereal and pasta  [place] with other whole grains  Use healthy fats  Try olive oil as a replacement for butter when cooking  Instead of putting butter or margarine on bread, try dipping it in flavored olive oil  Eat more seafood  Eat fish twice a week  Fresh or water-packed tuna, salmon, trout, mackerel and herring are healthy choices  Grilled fish tastes good and requires little cleanup  Avoid deep-fried fish  Reduce red meat  Substitute fish, poultry or beans for meat  If you eat meat, make sure it's lean and keep portions small  Spice it up  Herbs and spices boost flavor and lessen the need for salt  The Mediterranean diet is a delicious and healthy way to eat  Many people who switch to this style of eating say they'll never eat any other way      Source: http://www omar-maurice org/

## 2022-08-19 ENCOUNTER — TELEPHONE (OUTPATIENT)
Dept: BARIATRICS | Facility: CLINIC | Age: 57
End: 2022-08-19

## 2022-08-19 NOTE — TELEPHONE ENCOUNTER
I discussed the program requirements patient stated that her insurance carrier does not mandate monthly education/ visits and stated that her job does not permit her the time to maintain the required program appts  Armidashruthi Frederic stated that she has been in contact with her insurance carrier and informed me that her insurance will not reimburse for any future surgery if we hold this standard, she went on to say she was willing to make the required program appts only if we paid her wage she would miss from work  I explained that the program standard of monthly patient education is consist with each of our patients and she must adhere to this standard  Sana Frederic demanded she be scheduled for surgery, I explained that unless there is compliance with monthly education we would not be able to do so, as this was the rational behind being halted from the process I e non-compliance with program requirements

## 2022-08-19 NOTE — TELEPHONE ENCOUNTER
Patient called in and spoke to [facility], asking to be scheduled for surgery  Patient was irate and demanding  [facility]  [facility] asked to be called to be scheduled for surgery  Patient has not been in the office since 4/29/22  She refused to come into office d/t job schedule  Has not been educated by our office since 4/28/22  She called today expecting to "just be scheduled for surgery because my insurance said I don't need to do anything else"  Patient was halted on 7/29 d/t no communication back with the office  She has not answered phone calls, emails, or made an attempt to return to the office since 4/29/22  She cannot have surgery  Email with details sent to [facility] for further review  They will reach out to patient to discuss compliance and program requirements

## 2022-08-19 NOTE — TELEPHONE ENCOUNTER
Pt called to let us know that [facility] will not see her or perform her sx due to not going to monthly wt checks  Pt stated their insurance does not require wt checks and they refuse to come in for wt checks  Pt proceeded to say that she can get her sx in [facility] with those doctors because she already paid money towards clearances with her heart doctor  Pt upset that Nilson Grey' email was not provided to her  Pt requested to speak to someone in [place]  Connected pt to Salvatore CANTRELL

## 2022-08-19 NOTE — TELEPHONE ENCOUNTER
This patient was appropriately halted from our program due to non compliance with our program requirements  Several phone calls were made to her to remind her she needed to be seen in the office  The phone calls were without response  She called the office and demanded to be scheduled for surgery because her insurance does not require monthly visits  She became loud and belligerent with multiple staff members  I called the patient with Christian Hanna on the phone  Cedrick Coffman introduced her  She asked for my name and spelling of it  She insisted she did not need to come into our office on a monthly basis since her insurance does not require this  I reminded her she has not been in since April and that the monthly visits are necessary in order to fulfill our safety, compliance and education requirements  She was hollering at this point, stating she is going to shyanne us, get an , demanding our policies,   I told her that we can't tolerate being talked to in the manner she was talking and not going to take verbal abuse  She kept trying to speak over me by hollering my name  Then there as silence on the phone with Yoshi Moody and I  She must have hung up or was just not talking  Kike and I then hung up

## 2022-08-25 ENCOUNTER — OFFICE VISIT (OUTPATIENT)
Dept: BARIATRICS | Facility: CLINIC | Age: 57
End: 2022-08-25

## 2022-08-25 VITALS — HEIGHT: 65 IN | WEIGHT: 270.4 LBS | BODY MASS INDEX: 45.05 KG/M2

## 2022-08-25 DIAGNOSIS — E66.01 OBESITY, CLASS III, BMI 40-49.9 (MORBID OBESITY) (HCC): Primary | ICD-10-CM

## 2022-08-25 PROCEDURE — RECHECK

## 2022-08-25 NOTE — PROGRESS NOTES
Bariatric Nutrition Assessment Note  Type of surgery    Preop (no weight checks)  Surgery Date: TBD--leaning toward sleeve  Surgeon: Dr Gunner Costa  (consult on 22)    Nutrition Assessment   Beck Valentino  65 y o   female     Wt with BMI of 25: 148#  Pre-Op Excess Wt: 117#  Height 5' 4 5" (1 638 m), weight 123 kg (270 lb 6 4 oz)  Body mass index is 45 7 kg/m²  Net weight change:  +5 5lbs  Weight History   Onset of Obesity: Adult, usual body weight was 145#    Gained most of the weight over the past 6 years since quit smoking  Family history of obesity: Yes  Wt Loss Attempts: Commercial Programs ([employer], [place], etc )  Exercise  Self Created Diets (Portion Control, Healthy Food Choices, etc )  Noom  Maximum Wt Lost: 40-50lbs w/noom    Review of History and Medications   Past Medical History:   Diagnosis Date    Dental crown present     History of motion sickness     History of pneumonia     Left foot pain     "bone spur"    Right shoulder pain     and right rotator cuff problem    Stress     situational    Thalassemia     Wears contact lenses     Wears glasses      Past Surgical History:   Procedure Laterality Date    APPENDECTOMY      BREAST SURGERY Left     lump removed noncancer    MOUTH SURGERY      SD HALLUX RIGIDUS W/CHEILECTOMY 1ST MP JT W/IMPLT Left 2019    Procedure: 1ST MPJ TOTAL JOINT IMPLANT;  Surgeon: Viridiana Castillo DPM;  Location: [facility];  Service: Podiatry    ROTATOR CUFF REPAIR Left     ROTATOR CUFF REPAIR Right     "eventually needs replacement"     Social History     Socioeconomic History    Marital status:      Spouse name: Not on file    Number of children: Not on file    Years of education: Not on file    Highest education level: Not on file   Occupational History    Occupation:    Tobacco Use    Smoking status: Former Smoker     Years: 33 00     Types: Cigarettes     Quit date:      Years since quittin 6    Smokeless tobacco: Never Used    Tobacco comment: quit in 2016    Vaping Use    Vaping Use: Never used   Substance and Sexual Activity    Alcohol use: Yes     Comment: 2-3 per week    Drug use: Never    Sexual activity: Not on file   Other Topics Concern    Not on file   Social History Narrative    · Most recent tobacco use screenin2020      · Do you currently or have you served in the [employer]:   No        · Exercise level: Moderate  l      · Caffeine intake: Moderate  1 cup coffee/day     · Guns present in home: Yes      · Seat belts used routinely:   Yes      · Sunscreen used routinely:   Yes      · Smoke alarm in home: Yes      · Advance directive:   No     Last modified by graciela   2020, 15:35     Social Determinants of Health     Financial Resource Strain: Not on file   Food Insecurity: Not on file   Transportation Needs: Not on file   Physical Activity: Not on file   Stress: Not on file   Social Connections: Not on file   Intimate Partner Violence: Not on file   Housing Stability: Not on file       Current Outpatient Medications:     Cholecalciferol 25 MCG (1000 UT) capsule, Take by mouth daily (Patient not taking: Reported on 2022), Disp: , Rfl:     famotidine (PEPCID) 20 mg tablet, Take 1 tablet (20 mg total) by mouth 2 (two) times a day, Disp: 60 tablet, Rfl: 11    Ibuprofen 200 MG CAPS, Take by mouth as needed, Disp: , Rfl:     Multiple Vitamin (Multivitamin Adult) TABS, Take 1 tablet by mouth daily, Disp: , Rfl:     Food Intake and Lifestyle Assessment   Food Intake Assessment completed via usual diet recall  Wake:  Works from home so wakes up 6:30-7am  Breakfast: 8am (starts work at [email or web address] OR 2 eggs OR greek yogurt w/fruit OR eggs w/ turkey sausage   Coffee (16oz) w/stevia +non-dairy creamer (maybe one tbsp)  Snack: 10am-Greek Yogurt  Lunch:   1-2pm:  PBJ most days OR leftovers  Snack: -  Dinner: 5-6:00pm:  Mostly a meat and veggies and very limited carbs--Last night was 4-5oz chicken, 1 cup green beans and 1/2-1 cup mashed potatoes  Snack: not usually  On the weekends:   there will be drinking--about 6 beers along with snacking  Most often on weekends will have more of a brunch and an earlier dinner and snacking on Saturday  In the fall does football Sunday and does more "football food"    Beverage intake: water, coffee/tea and alcohol  Protein supplement: has had premier in the past  Estimated protein intake per day: 70-80gm  Estimated fluid intake per day: 32-48oz water, 16oz coffee, middle of covid was as much as 6 beers per day, but now depends on what is going on  If friends are over then will have 6 drinks, but other days nothing (could have up to a case in a week)  Meals eaten away from home: 1x/week--chinese boneless spare ribs w/white rice  Typical meal pattern: 2-3 meals per day and 2 snacks per day  Eating Behaviors: Consumption of high calorie/ high fat foods, Consumption of high calorie beverages, Large portion sizes, Frequent snacking/ grazing, Mindless eating and Emotional eating  Food allergies or intolerances: Allergies   Allergen Reactions    Erythromycin Vomiting     "projectile vomiting"    Morphine Headache     Category: Adverse Reaction;      Cultural or Yazdanism considerations: none    Physical Assessment  Physical Activity  Types of exercise: doing recumbent bike 5x/week up to 20 mins at a time, will try to get more in  Current physical limitations: having foot issues  Has a replaced joint and a heel spur        Psychosocial Assessment   Support systems: parent(s) sibling(s) friend(s) relative(s)  Socioeconomic factors: none  Lives alone--does all the cooking and food shopping    Nutrition Diagnosis  Diagnosis: Overweight / Obesity (NC-3 3)  Related to: Physical inactivity and Excessive energy intake  As Evidenced by: BMI >25     Nutrition Prescription: Recommend the following diet  Regular    Interventions and Teaching   Discussed pre-op and post-op nutrition guidelines  Patient educated and handouts provided  Surgical changes to stomach / GI  Capacity of post-surgery stomach  Diet progression  Adequate hydration  Sugar and fat restriction to decrease "dumping syndrome"  Fat restriction to decrease steatorrhea  Expected weight loss  Weight loss plateaus/ possibility of weight regain  Exercise  Suggestions for pre-op diet  Nutrition considerations after surgery  Protein supplements  Meal planning and preparation  Appropriate carbohydrate, protein, and fat intake, and food/fluid choices to maximize safe weight loss, nutrient intake, and tolerance   Dietary and lifestyle changes  Possible problems with poor eating habits  Intuitive eating  Techniques for self monitoring and keeping daily food journal  Potential for food intolerance after surgery, and ways to deal with them including: lactose intolerance, nausea, reflux, vomiting, diarrhea, food intolerance, appetite changes, gas  Vitamin / Mineral supplementation of Multivitamin with minerals and Vitamin D    Education provided to: patient    Barriers to learning: No barriers identified  Readiness to change: preparation    Prior research on procedure: books, internet and friends or family    Comprehension: verbalizes understanding     Expected Compliance: good  Recommendations  Pt is an appropriate candidate for surgery  Yes  Evaluation / Monitoring  Dietitian to Monitor: Eating pattern as discussed Tolerance of nutrition prescription Body weight Lab values Physical activity  Pre-op weight loss:  Do not gain  Lose 5% by DOS w/lifestyle changes and possible pre-op diet:  -13# (252#)  Must maintain BMI of 40:  237#  Pt presents today after lack of communication with the office since April  Noted she was weighed today with her sneakers on since she refused to take them off due to a heel spur/foot pain  Pt presents with a 5 5lb weight increase from her start weight  Reviewed diet recall and pt appears to be eating 3 meals plus one snack per day  Her food choices during the work week appear reasonable, but on the weekends she tends to snack more, choose higher fat foods and admits to drinking up to 6 beers on a Saturday night with her friends  Pt is aware that this is contributing to her calories intake and that it needs to be cut of pre- and post-op  Discussed with pt the benefits of logging her food intake to have a better understanding of where are calories are coming from  Reviewed the [place] with pt in office  Advised pt she needs to be below her start weight by the time we are ready to submit her paperwork to insurance  Pt verbalized understanding  Advised pt that we will discuss vitamins and the pre-op diet at her next visit  Pt's rely was "I need to start this process all over again?"  I explained to pt that she is not starting the process over again  If she were she would be starting with the 3hr evaluation and all her medical clearance  Instead, she is scheduled for standard check-ins every 2 weeks through mid-Sept at which point she should have completed all her pending requirements ie: blood work and virtual or live support group  Pt verbalized understanding and wrote down dates of f/u appts       Goals  Decrease alcohol intake  Food journal via [employer]--adhere to 8990-2248 calories per day  Exercise 30 minutes 5 times per week--can start with 10 mins 3x/week  Complete lesson plans 1-6  Eat every 3-4 hrs with protein at each meal and snack  Be more mindful of food choices on the weekends  Can use a protein shake as a meal replacement  Eliminate mindless snacking  Time Spent:   30 mins

## 2022-08-25 NOTE — PROGRESS NOTES
Patient presents for pre-op check in for education and support, current weight 270 4lbs  Eating behaviors/food choices: Patient refused to take off her sneakers to be weighed, she later explained that she is a heel spir and other foot pain which is why she avoids taking her shoes off and on  She is up 5 6lbs from her last visit with the surgeon in April but she pointed out that she did have her sneakers off at that last weight in  Patient reports that she's having four meals a day but upon description she is having breakfast, a snack, lunch and then dinner  She is focusing on proteins with some carbs, says she's eyeballing her portions, using her hand as a guide for example  She says she eats distracted at breakfast because she's working, quickly at lunch because she needs to get back to work and has a standard dinner that she eats at a regular pace  The importance of mindful eating was stressed, smaller bites, chewing well to aid in digestion and allow her to tune into satiation cues  Patient says she will stop eating if she is feeling full before her plate is empty  Activity/Exercise:  Patient says she is using her recumbent bike for 20 mins, five days a week  This doesn't irritate her foot but this is all she can really do activity wise  She is not stretching because she feels her foot limits her  Benefit of stretching was stressed since it can help reduce pain and keep body limber over time, using seated stretches to reduce stress on her foot  Mental Health/Wellness:  Patient denies any mood changes or stress other than trying to get through her surgical process  She says work is not stressful, she's able to take care of her responsibilities  She did mention she knows the alcohol is going to be something she needs to focus on, that she can go without it but when she does enjoy it it tends to be in higher portions    She will have about six cans of beer at home when entertaining friends, she said she doesn't drink alone and she doesn't drink while out  She said she entertained friends a week or two ago, it was a short visit so she only had three  She has been talking with family who have had the surgery and they also stress the importance of abstaining from alcohol use post-op  They did encourage her to switch alcohol if she does plan to drink, to have something that she doesn't like so she won't overdo it  TARYN reviewed education about the risk of consuming alcohol after surgery and highlighted the importance of evaluating why she's having alcohol just as she would snacking on food, asking herself why have alcohol if you're not going to enjoy it  Recommended that she stop drinking now, she usually buys the alcohol so suggested that she just not have it in the home  Patient said this won't be a problem for her that she can avoid drinking      Workflow review:    Attend two more office visits with TARYN and FAN for education and support  Labs to be completed  Complete support group, schedule given  Get letter from PCP    Goals:    - practice mindfulness when eating, avoid being distracted, slow down and tune into satiation cues  - consider adding in stretching with activity   - abstain from all alcohol use pre and post op    Next Appointment:  With Lala on 9/8/2022

## 2022-09-01 ENCOUNTER — TELEPHONE (OUTPATIENT)
Dept: BARIATRICS | Facility: CLINIC | Age: 57
End: 2022-09-01

## 2022-09-01 NOTE — TELEPHONE ENCOUNTER
Patient called in demanding I remove documentation from her chart immediately  I knew it was the patient because our office has caller ID  She received a call from central scheduling in reference to a referral for a mammogram our office put in  A requirement of a Care Gap  She continued to advise that if our office continues to order things that are not required there will be issues between the office, herself, and the insurance  Call transferred to supervisor David Damico who was at [facility]

## 2022-09-01 NOTE — TELEPHONE ENCOUNTER
Patient contacted the [facility] [place] office today 9/1/2022  MR Steve Key fielded the call from the patient  MR contacted  (Jim Mcdermott) via phone extension, as he was on site working in the [id number] workstation in the rear of the office location  The MR stated that she had the patient on another line on hold requesting to speak with the supervisor, and that the patient was being verbally aggressive, rude, and threatening to her, demanding that the caregap order for a mammogram be removed from her chart immediately  Admin was then  transferred the patient call  When the Admin received the patient within a minutes time of being on hold the patient continued to be rude and aggressive to the [address]  Patient stated she was upset over the mammogram caregap order  Patient stated that she will not compete the mammogram as this had nothing to do with her bariatric surgery, patient demanded that this be removed immediately from her chart, at which time the Admin informed the patient he could not do, but that the patient did not need to follow through with the order, and instead decline  Patient stated she felt this was a "Money Making Scheme by Mariela Pandey" in aggressive loud tone Admin informed patient that the mammogram caregap follow up was part of the Networks initiative for preventative care and that she did not need to fulfill the order  Patient continued to be verbally aggressive to [address] still demanding this be removed from her chart immediately  Patient then stated in an aggressive and loud tone to Admin that "I better have my surgery in October or else" Patient stated that if she does not have surgery in October then she would litigate and that Mariela Pandey would have issues with her insurance carrier  Admin stated that [facility] could not guarantee she would have surgery in October (2022) patient then threatened litigation again if she did not   Patient continued with rude, and aggressive behavior with a loud tone over the phone threatening action from her attorneys and insurance carrier multiple times if she did not have surgery in October  Admin stated again that [facility] cannot guarantee bariatric surgery in October, as there are still numerous tasks the patient needs to complete prior to being schedule for surgery- to meet program requirements  as outlined on the letter provided to the patient on Thursday 8/25/2022  Letter/ acknowledgement was provided in person by myself Jayde Porras at the [address] [place] office as witnessed by the [facility]  Patient continued to be rude and aggressive on the phone, patient was yelling loudly and again demanding that the caregap order be removed, and that her surgery "better occur in October, or else" Patient was presented with letter to address past and potential future issues surrounding conduct along with outlining her remaining tasks for the [facility] Bariatric program, patient agreed to comply on 8/25/2022 when presented with the letter and had expressed verbal understanding of the letter's objectives and expectations for continued care through [place] [address] [facility]  As stated in the letter provided  and agreed upon: Patient should refrain from any behavior or communication which may disrupt or detract from bariatric process, including but not limited to threatening statements and verbally aggressive behavior while in person and on the phone  AND Patient shall act in a manner that recognizes and promotes a healthy, patient, staff, provider, and management relationship  The patient did not meet the agreed upon expectations as outlined in the letter on 9/1/2022 with MR staff member and Izaiah Reyes  Following call from patient Admin contacted leadership to inform them of this encounter

## 2022-09-02 ENCOUNTER — TELEPHONE (OUTPATIENT)
Dept: BARIATRICS | Facility: CLINIC | Age: 57
End: 2022-09-02

## 2022-09-02 NOTE — TELEPHONE ENCOUNTER
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

## 2022-09-02 NOTE — TELEPHONE ENCOUNTER
Called patient at 11:38AM on 9/2/2022 to inform patient of dismissal from [place] [address] [facility] due non-compliance with agreed upon standards for behaviors  Left Voicemail for Patient stating time and date of call, stating that [address] has formally dismissed her from the [place] [address] [facility]

## 2023-05-18 ENCOUNTER — OFFICE VISIT (OUTPATIENT)
Dept: CARDIOLOGY CLINIC | Facility: CLINIC | Age: 58
End: 2023-05-18

## 2023-05-18 VITALS
BODY MASS INDEX: 44.65 KG/M2 | HEART RATE: 97 BPM | HEIGHT: 65 IN | WEIGHT: 268 LBS | SYSTOLIC BLOOD PRESSURE: 134 MMHG | OXYGEN SATURATION: 97 % | DIASTOLIC BLOOD PRESSURE: 72 MMHG

## 2023-05-18 DIAGNOSIS — E78.2 MIXED HYPERLIPIDEMIA: ICD-10-CM

## 2023-05-18 DIAGNOSIS — E66.01 OBESITY, CLASS III, BMI 40-49.9 (MORBID OBESITY) (HCC): ICD-10-CM

## 2023-05-18 DIAGNOSIS — R03.0 ELEVATED BP WITHOUT DIAGNOSIS OF HYPERTENSION: ICD-10-CM

## 2023-05-18 DIAGNOSIS — Z01.810 PREOPERATIVE CARDIOVASCULAR EXAMINATION: Primary | ICD-10-CM

## 2023-05-18 RX ORDER — AMLODIPINE BESYLATE 5 MG/1
5 TABLET ORAL DAILY
COMMUNITY
Start: 2023-05-01

## 2023-05-18 NOTE — PROGRESS NOTES
"[facility] [address] [facility] [address]  Phone#  [phone]  Fax#  [phone]  [facility] Cardiology Office Consultation             NAME: Reji Fagan  AGE: 63 y o  SEX: female   : 1965   MRN: [id number]    DATE: 2023  TIME: 3:44 PM    Cardiology Problem list:  : Ten year ASCVD risk 5%  : Abnormal EKG: LVH, poor R wave progression at [facility]  Dyslipidemia  Pre hypertension  Obesity:  BMI 45  Gastritis and hiatal hernia:      Assessment and plan:    Preoperative cardiovascular examination  Revised cardiac risk index: \"high-risk\" surgery (intraperitoneal)  RCI RISK CLASS II (1 risk factor, risk of major cardiac compl  appr  1 3%)  Cardiac risk factors include dyslipidemia, obesity (BMI >= 30 kg/m2), sedentary lifestyle, family history and smoking/ tobacco exposure  Functional capacity exceeds 4 metabolic equivalents  No current cardiac symptoms  Previous EKG at our office was normal except for left axis deviation  Recent preoperative EKG reviewed personally: Sinus rhythm, rate 85, left axis deviation, poor R wave progression and LVH  EKG today: NSR, LAD, LVH, Poor R wave progression  No obvious Cardiac Contraindication for Planned Surgical Procedures  Moderate cardiac risk  Exercise treadmill stress test was advised previously to objectively assess functional capacity and hypertension control      Hyperlipidemia  10-year ASCVD risk is about 5%  Hemoglobin A1c was 6 4  Last lipid profile from  reviewed  Total cholesterol 246, , HDL 54  Last hemoglobin A1c was 6 3  No repeat lipids done  Follow-up lipids postsurgery  Calcium scoring advised  If the lipids are still abnormal postsurgery or calcium score elevated, statin therapy will be indicated      Obesity, Class III, BMI 46  Bariatric surgery planned  Continue regular exercise    Continue efforts at weight loss including diet modification, increased physical activity " and avoidance of calorie dense foods  Mediterranean style plant based diet and calorie restriction will be beneficial        Elevated BP without diagnosis of hypertension  BP Readings from Last 3 Encounters:   05/18/23 134/72   08/01/22 138/82   07/14/22 164/67   Home blood pressure monitoring  Continue current medications  Lifestyle modification  Weight loss with bariatric surgery will help blood pressure control  Tobacco exposure  Quit 7 years ago  Advised continued abstinence from smoking           Chief Complaint   Patient presents with   • Pre-op Clearance       HPI:    Valera Kawasaki is a 63 y o  female who has been referred here for assessment of preoperative cardiovascular risk prior to upcoming bariatric surgery  Preoperative consultation is at the request of Dr Lyly Alberts  She was previously seen and 8/22 for preoperative evaluation for planned bariatric surgery  Surgery is still not been done  She is here for repeat preoperative cardiac assessment  Surgery is now scheduled for next month  She has a history of dyslipidemia, tobacco use and family history of premature CAD  Previously preoperative stress testing and calcium score was advised however she did not get those test done  She was [facility] at [facility] and underwent an pre-op EKG which was abnormal and read as lateral infarction  In light of this finding repeat preoperative cardiac consult was requested  She is not keen on stress test due to her functional limitations  I reviewed her prior EKG from this month independently and this shows sinus rhythm, left axis deviation, LVH and poor R wave progression in the precordial leads  EKG reviewed today  She continues to have good functional capacity  She has no exertional angina or dyspnea  No history of TIA or stroke  She is not a diabetic  Her blood pressure remains controlled  No recent lipid evaluation      Past history, family history, social history, current "medications, vital signs, recent lab and imaging studies and  prior cardiology studies reviewed independently on this visit  Exercise Capacity:  · Able to walk 4 blocks without symptoms?: Yes  · Able to walk 2 flights without symptoms?: Yes, with stopping    Personal history of venous thromboembolic disease? No    Assessment of Cardiac Contraindications:  · No history of severe angina or MI in the last 6 weeks  No recent PCI  · No recent decompensated heart failure   · No recent serious arrhythmias   · No known severe heart valve disease including severe aortic stenosis or symptomatic mitral stenosis    Past history, family history, social history, current medications, vital signs, recent lab and imaging studies and  prior cardiology studies reviewed independently on this visit  Allergies   Allergen Reactions   • Erythromycin Vomiting     \"projectile vomiting\"   • Morphine Headache     Category:  Adverse Reaction;    • Other Other (See Comments)     Post-nasal drip, cough       Current Outpatient Medications:   •  amLODIPine (NORVASC) 5 mg tablet, Take 5 mg by mouth daily, Disp: , Rfl:   •  Multiple Vitamin (Multivitamin Adult) TABS, Take 1 tablet by mouth daily, Disp: , Rfl:     Past Medical History:   Diagnosis Date   • Dental crown present    • History of motion sickness    • History of pneumonia    • Left foot pain     \"bone spur\"   • Right shoulder pain     and right rotator cuff problem   • Stress     situational   • Thalassemia    • Wears contact lenses    • Wears glasses      Past Surgical History:   Procedure Laterality Date   • APPENDECTOMY     • BREAST SURGERY Left     lump removed noncancer   • MOUTH SURGERY     • ID HALLUX RIGIDUS W/CHEILECTOMY 1ST MP JT W/IMPLT Left 8/28/2019    Procedure: 1ST MPJ TOTAL JOINT IMPLANT;  Surgeon: Rayray Alvarez DPM;  Location: [facility];  Service: Podiatry   • ROTATOR CUFF REPAIR Left    • ROTATOR CUFF REPAIR Right     \"eventually needs replacement\"     Family " History   Problem Relation Age of Onset   • Thyroid disease Mother         parathyroid removed   • Heart disease Father    • Hodgkin's lymphoma Sister      Social History   reports that she quit smoking about 7 years ago  Her smoking use included cigarettes  She has never used smokeless tobacco  She reports current alcohol use  She reports that she does not use drugs  Review of Systems   Constitutional: Negative for fever  Respiratory: Negative for chest tightness, shortness of breath and wheezing  Cardiovascular: Negative for chest pain, palpitations and leg swelling  Musculoskeletal: Positive for arthralgias and gait problem  Skin: Negative for rash  Neurological: Negative for syncope  Hematological: Does not bruise/bleed easily  Psychiatric/Behavioral: Negative  Objective:     Vitals:    05/18/23 1542   BP: 134/72   Pulse: 83   SpO2: 97%     Wt Readings from Last 3 Encounters:   05/18/23 122 kg (268 lb)   08/25/22 123 kg (270 lb 6 4 oz)   08/01/22 122 kg (268 lb)     Pulse Readings from Last 3 Encounters:   05/18/23 83   08/01/22 86   07/14/22 92     BP Readings from Last 3 Encounters:   05/18/23 134/72   08/01/22 138/82   07/14/22 164/67       Physical Exam  Vitals reviewed  Constitutional:       General: She is not in acute distress  HENT:      Head: Normocephalic  Cardiovascular:      Rate and Rhythm: Normal rate and regular rhythm  Heart sounds: S1 normal and S2 normal  No murmur heard  Pulmonary:      Breath sounds: No wheezing or rhonchi  Musculoskeletal:      Right lower leg: No edema  Left lower leg: No edema  Skin:     General: Skin is warm  Neurological:      Mental Status: She is alert  Mental status is at baseline     Psychiatric:         Mood and Affect: Mood normal          Pertinent Laboratory/Diagnostic Studies:    Laboratory studies reviewed personally by Chace Adams MD    Labs reviewed in care everywhere and results summarized above    Imaging "Studies:     No recent imaging studies    EKG personally reviewed  Current diagnoses:  No diagnosis found  Munira Torres MD, [facility] [place]    Portions of the record may have been created with voice recognition software  Occasional wrong word or \"sound a like\" substitutions may have occurred due to the inherent limitations of voice recognition software  Read the chart carefully and recognize, using context, where substitutions have occurred    "

## 2023-05-18 NOTE — PATIENT INSTRUCTIONS
Continue calorie restriction  Intermittent fasting  Continue exercise  Home blood pressure monitoring  Can proceed with planned bariatric surgery at acceptable cardiac risk  Calcium score after surgery  Please call central scheduling at [phone] to schedule your test   Please contact our office if you have any difficulty in scheduling your tests

## 2023-05-18 NOTE — LETTER
"May 18, 2023     Claudean Mcburney, [address] [place] [address]    Patient: Augusta Shin   YOB: 1965   Date of Visit: 2023       Dear Dr Jun Watson: Thank you for referring Jimmy Kennedy to me for evaluation  Below are my notes for this consultation  If you have questions, please do not hesitate to call me  I look forward to following your patient along with you  Sincerely,        Kun Owens MD        CC: MD Duane Thomson MD Aubrey Salle, MD  2023  3:53 PM  Incomplete  [facility] [address] [address]  Phone#  [phone]  Fax#  [phone]  [facility]ation             NAME: Augusta Shin  AGE: 63 y o  SEX: female   : 1965   MRN: [id number]    DATE: 2023  TIME: 3:44 PM    Cardiology Problem list:  : Ten year ASCVD risk 5%  : Abnormal EKG: LVH, poor R wave progression at [facility]  Dyslipidemia  Pre hypertension  Obesity:  BMI 45  Gastritis and hiatal hernia:      Assessment and plan:    Preoperative cardiovascular examination  Revised cardiac risk index: \"high-risk\" surgery (intraperitoneal)  RCI RISK CLASS II (1 risk factor, risk of major cardiac compl  appr  1 3%)  Cardiac risk factors include dyslipidemia, obesity (BMI >= 30 kg/m2), sedentary lifestyle, family history and smoking/ tobacco exposure  Functional capacity exceeds 4 metabolic equivalents  No current cardiac symptoms  Previous EKG at our office was normal except for left axis deviation  Recent preoperative EKG reviewed personally: Sinus rhythm, rate 85, left axis deviation, poor R wave progression and LVH  EKG today: NSR, LAD, LVH, Poor R wave progression  No obvious Cardiac Contraindication for Planned Surgical Procedures  Moderate cardiac risk    Exercise treadmill stress test was advised previously to objectively assess functional capacity and hypertension " control      Hyperlipidemia  10-year ASCVD risk is about 5%  Hemoglobin A1c was 6 4  Last lipid profile from 05/21 reviewed  Total cholesterol 246, , HDL 54  Last hemoglobin A1c was 6 3  No repeat lipids done  Follow-up lipids postsurgery  Calcium scoring advised  If the lipids are still abnormal postsurgery or calcium score elevated, statin therapy will be indicated      Obesity, Class III, BMI 46  Bariatric surgery planned  Continue regular exercise  Continue efforts at weight loss including diet modification, increased physical activity and avoidance of calorie dense foods  Mediterranean style plant based diet and calorie restriction will be beneficial        Elevated BP without diagnosis of hypertension  BP Readings from Last 3 Encounters:   05/18/23 134/72   08/01/22 138/82   07/14/22 164/67   Home blood pressure monitoring  Continue current medications  Lifestyle modification  Weight loss with bariatric surgery will help blood pressure control  Tobacco exposure  Quit 7 years ago  Advised continued abstinence from smoking           Chief Complaint   Patient presents with   • Pre-op Clearance       HPI:    Arnoldo Paul is a 63 y o  female who has been referred here for assessment of preoperative cardiovascular risk prior to upcoming bariatric surgery  Preoperative consultation is at the request of Dr Amrik Roman  She was previously seen and 8/22 for preoperative evaluation for planned bariatric surgery  Surgery is still not been done  She is here for repeat preoperative cardiac assessment  Surgery is now scheduled for next month  She has a history of dyslipidemia, tobacco use and family history of premature CAD  Previously preoperative stress testing and calcium score was advised however she did not get those test done  She was seen at [facility] and underwent an pre-op EKG which was abnormal and read as lateral infarction    In light of this finding repeat preoperative "cardiac consult was requested  She is not keen on stress test due to her functional limitations  I reviewed her prior EKG from this month independently and this shows sinus rhythm, left axis deviation, LVH and poor R wave progression in the precordial leads  She continues to have good functional capacity  She has no exertional angina or dyspnea  No history of TIA or stroke  She is not a diabetic  Her blood pressure remains controlled  No recent lipid evaluation  Past history, family history, social history, current medications, vital signs, recent lab and imaging studies and  prior cardiology studies reviewed independently on this visit  Exercise Capacity:  · Able to walk 4 blocks without symptoms?: Yes  · Able to walk 2 flights without symptoms?: Yes, with stopping    Personal history of venous thromboembolic disease? No    Assessment of Cardiac Contraindications:  · No history of severe angina or MI in the last 6 weeks  No recent PCI  · No recent decompensated heart failure   · No recent serious arrhythmias   · No known severe heart valve disease including severe aortic stenosis or symptomatic mitral stenosis    Past history, family history, social history, current medications, vital signs, recent lab and imaging studies and  prior cardiology studies reviewed independently on this visit  Allergies   Allergen Reactions   • Erythromycin Vomiting     \"projectile vomiting\"   • Morphine Headache     Category:  Adverse Reaction;    • Other Other (See Comments)     Post-nasal drip, cough       Current Outpatient Medications:   •  amLODIPine (NORVASC) 5 mg tablet, Take 5 mg by mouth daily, Disp: , Rfl:   •  Multiple Vitamin (Multivitamin Adult) TABS, Take 1 tablet by mouth daily, Disp: , Rfl:     Past Medical History:   Diagnosis Date   • Dental crown present    • History of motion sickness    • History of pneumonia    • Left foot pain     \"bone spur\"   • Right shoulder pain     and right rotator cuff " "problem   • Stress     situational   • Thalassemia    • Wears contact lenses    • Wears glasses      Past Surgical History:   Procedure Laterality Date   • APPENDECTOMY     • BREAST SURGERY Left     lump removed noncancer   • MOUTH SURGERY     • MA HALLUX RIGIDUS W/CHEILECTOMY 1ST MP JT W/IMPLT Left 8/28/2019    Procedure: 1ST MPJ TOTAL JOINT IMPLANT;  Surgeon: Glenny Srivastava DPM;  Location: [facility];  Service: Podiatry   • ROTATOR CUFF REPAIR Left    • ROTATOR CUFF REPAIR Right     \"eventually needs replacement\"     Family History   Problem Relation Age of Onset   • Thyroid disease Mother         parathyroid removed   • Heart disease Father    • Hodgkin's lymphoma Sister      Social History   reports that she quit smoking about 7 years ago  Her smoking use included cigarettes  She has never used smokeless tobacco  She reports current alcohol use  She reports that she does not use drugs  Review of Systems   Constitutional: Negative for fever  Respiratory: Negative for chest tightness, shortness of breath and wheezing  Cardiovascular: Negative for chest pain, palpitations and leg swelling  Musculoskeletal: Positive for arthralgias and gait problem  Skin: Negative for rash  Neurological: Negative for syncope  Hematological: Does not bruise/bleed easily  Psychiatric/Behavioral: Negative  Objective:     Vitals:    05/18/23 1542   BP: 134/72   Pulse: 83   SpO2: 97%     Wt Readings from Last 3 Encounters:   05/18/23 122 kg (268 lb)   08/25/22 123 kg (270 lb 6 4 oz)   08/01/22 122 kg (268 lb)     Pulse Readings from Last 3 Encounters:   05/18/23 83   08/01/22 86   07/14/22 92     BP Readings from Last 3 Encounters:   05/18/23 134/72   08/01/22 138/82   07/14/22 164/67       Physical Exam  Vitals reviewed  Constitutional:       General: She is not in acute distress  HENT:      Head: Normocephalic  Cardiovascular:      Rate and Rhythm: Normal rate and regular rhythm        Heart sounds: S1 " "normal and S2 normal  No murmur heard  Pulmonary:      Breath sounds: No wheezing or rhonchi  Musculoskeletal:      Right lower leg: No edema  Left lower leg: No edema  Skin:     General: Skin is warm  Neurological:      Mental Status: She is alert  Mental status is at baseline  Psychiatric:         Mood and Affect: Mood normal          Pertinent Laboratory/Diagnostic Studies:    Laboratory studies reviewed personally by Justyn Malone MD    Labs reviewed in care everywhere and results summarized above    Imaging Studies:     No recent imaging studies    EKG personally reviewed  Current diagnoses:  No diagnosis found  Stanford Reyez MD, [facility]    Portions of the record may have been created with voice recognition software  Occasional wrong word or \"sound a like\" substitutions may have occurred due to the inherent limitations of voice recognition software  Read the chart carefully and recognize, using context, where substitutions have occurred  Justyn Malone MD  2023  3:45 PM  Sign when Signing Visit    [address] [address]  Phone#  [phone]  Fax#  [phone]  [facility] Consultation             NAME: Dee Dee Chambers  AGE: 63 y o  SEX: female   : 1965   MRN: [id number]    DATE: 2023  TIME: 3:44 PM    Cardiology Problem list:  : Ten year ASCVD risk 5%  : Abnormal EKG: LVH, poor R wave progression at [facility]  Dyslipidemia  Pre hypertension  Obesity:  BMI 45  Gastritis and hiatal hernia:      Assessment and plan:    Preoperative cardiovascular examination  Revised cardiac risk index: \"high-risk\" surgery (intraperitoneal)  RCI RISK CLASS II (1 risk factor, risk of major cardiac compl  appr  1 3%)  Cardiac risk factors include dyslipidemia, obesity (BMI >= 30 kg/m2), sedentary lifestyle, family history and smoking/ tobacco exposure  Functional capacity exceeds 4 metabolic equivalents    No " current cardiac symptoms  Previous EKG at our office was normal except for left axis deviation  Recent preoperative EKG reviewed personally: Sinus rhythm, rate 85, left axis deviation, poor R wave progression and LVH  No obvious Cardiac Contraindication for Planned Surgical Procedures  Exercise treadmill stress test was advised previously to objectively assess functional capacity and hypertension control      Hyperlipidemia  10-year ASCVD risk is about 5%  Hemoglobin A1c was 6 4  Last lipid profile from 05/21 reviewed  Total cholesterol 246, , HDL 54  Last hemoglobin A1c was 6 3  No repeat lipids done  Follow-up lipids postsurgery  Calcium scoring advised  If the lipids are still abnormal postsurgery or calcium score elevated, statin therapy will be indicated      Obesity, Class III, BMI 46  Bariatric surgery planned  Continue regular exercise  Continue efforts at weight loss including diet modification, increased physical activity and avoidance of calorie dense foods  Mediterranean style plant based diet and calorie restriction will be beneficial           Elevated BP without diagnosis of hypertension  BP Readings from Last 3 Encounters:   05/18/23 134/72   08/01/22 138/82   07/14/22 164/67   Home blood pressure monitoring  Continue current medications  Lifestyle modification  Weight loss with bariatric surgery will help blood pressure control  Tobacco exposure  Quit 7 years ago  Advised continued abstinence from smoking           Chief Complaint   Patient presents with   • Pre-op Clearance       HPI:    Reji Fagan is a 63 y o  female who has been referred here for assessment of preoperative cardiovascular risk prior to upcoming bariatric surgery  Preoperative consultation is at the request of Dr Klever Uriarte  She was previously seen and 8/22 for preoperative evaluation for planned bariatric surgery  Surgery is still not been done    She is here for repeat preoperative cardiac "assessment  Surgery is now scheduled for next month  She has a history of dyslipidemia, tobacco use and family history of premature CAD  Previously preoperative stress testing and calcium score was advised however she did not get those test done  She was seen at [facility] and underwent an pre-op EKG which was abnormal and read as lateral infarction  In light of this finding repeat preoperative cardiac consult was requested  She is not keen on stress test due to her functional limitations  I reviewed her prior EKG from this month independently and this shows sinus rhythm, left axis deviation, LVH and poor R wave progression in the precordial leads  She continues to have good functional capacity  She has no exertional angina or dyspnea  No history of TIA or stroke  She is not a diabetic  Her blood pressure remains controlled  No recent lipid evaluation  Past history, family history, social history, current medications, vital signs, recent lab and imaging studies and  prior cardiology studies reviewed independently on this visit  Exercise Capacity:  · Able to walk 4 blocks without symptoms?: Yes  · Able to walk 2 flights without symptoms?: Yes    Personal history of venous thromboembolic disease? No    Assessment of Cardiac Contraindications:  · No history of severe angina or MI in the last 6 weeks  No recent PCI  · No recent decompensated heart failure   · No recent serious arrhythmias   · No known severe heart valve disease including severe aortic stenosis or symptomatic mitral stenosis    Past history, family history, social history, current medications, vital signs, recent lab and imaging studies and  prior cardiology studies reviewed independently on this visit  Allergies   Allergen Reactions   • Erythromycin Vomiting     \"projectile vomiting\"   • Morphine Headache     Category:  Adverse Reaction;    • Other Other (See Comments)     Post-nasal drip, cough       Current Outpatient " "Medications:   •  amLODIPine (NORVASC) 5 mg tablet, Take 5 mg by mouth daily, Disp: , Rfl:   •  Multiple Vitamin (Multivitamin Adult) TABS, Take 1 tablet by mouth daily, Disp: , Rfl:     Past Medical History:   Diagnosis Date   • Dental crown present    • History of motion sickness    • History of pneumonia    • Left foot pain     \"bone spur\"   • Right shoulder pain     and right rotator cuff problem   • Stress     situational   • Thalassemia    • Wears contact lenses    • Wears glasses      Past Surgical History:   Procedure Laterality Date   • APPENDECTOMY     • BREAST SURGERY Left     lump removed noncancer   • MOUTH SURGERY     • NM HALLUX RIGIDUS W/CHEILECTOMY 1ST MP JT W/IMPLT Left 8/28/2019    Procedure: 1ST MPJ TOTAL JOINT IMPLANT;  Surgeon: Dominga Jc DPM;  Location: [facility];  Service: Podiatry   • ROTATOR CUFF REPAIR Left    • ROTATOR CUFF REPAIR Right     \"eventually needs replacement\"     Family History   Problem Relation Age of Onset   • Thyroid disease Mother         parathyroid removed   • Heart disease Father    • Hodgkin's lymphoma Sister      Social History   reports that she quit smoking about 7 years ago  Her smoking use included cigarettes  She has never used smokeless tobacco  She reports current alcohol use  She reports that she does not use drugs  Review of Systems   Constitutional: Negative for fever  Respiratory: Negative for chest tightness, shortness of breath and wheezing  Cardiovascular: Negative for chest pain, palpitations and leg swelling  Musculoskeletal: Positive for arthralgias  Skin: Negative for rash  Neurological: Negative for syncope  Hematological: Does not bruise/bleed easily  Psychiatric/Behavioral: Negative          Objective:     Vitals:    05/18/23 1542   BP: 134/72   Pulse: 83   SpO2: 97%     Wt Readings from Last 3 Encounters:   05/18/23 122 kg (268 lb)   08/25/22 123 kg (270 lb 6 4 oz)   08/01/22 122 kg (268 lb)     Pulse Readings from Last 3 " "Encounters:   05/18/23 83   08/01/22 86   07/14/22 92     BP Readings from Last 3 Encounters:   05/18/23 134/72   08/01/22 138/82   07/14/22 164/67       Physical Exam  Vitals reviewed  Constitutional:       General: She is not in acute distress  HENT:      Head: Normocephalic  Cardiovascular:      Rate and Rhythm: Normal rate and regular rhythm  Heart sounds: S1 normal and S2 normal  No murmur heard  Pulmonary:      Breath sounds: No wheezing or rhonchi  Musculoskeletal:      Right lower leg: No edema  Left lower leg: No edema  Skin:     General: Skin is warm  Neurological:      Mental Status: She is alert  Mental status is at baseline  Psychiatric:         Mood and Affect: Mood normal          Pertinent Laboratory/Diagnostic Studies:    Laboratory studies reviewed personally by Lori Gil MD    Labs reviewed in care everywhere and results summarized above    Imaging Studies:     No recent imaging studies    EKG personally reviewed  Current diagnoses:  No diagnosis found  Laina Terrazas MD, [facility]    Portions of the record may have been created with voice recognition software  Occasional wrong word or \"sound a like\" substitutions may have occurred due to the inherent limitations of voice recognition software  Read the chart carefully and recognize, using context, where substitutions have occurred      "

## 2024-02-23 ENCOUNTER — APPOINTMENT (EMERGENCY)
Dept: RADIOLOGY | Facility: HOSPITAL | Age: 59
End: 2024-02-23
Payer: COMMERCIAL

## 2024-02-23 ENCOUNTER — HOSPITAL ENCOUNTER (EMERGENCY)
Facility: HOSPITAL | Age: 59
Discharge: HOME | End: 2024-02-23
Attending: EMERGENCY MEDICINE
Payer: COMMERCIAL

## 2024-02-23 VITALS
HEART RATE: 68 BPM | WEIGHT: 268 LBS | HEIGHT: 65 IN | OXYGEN SATURATION: 100 % | RESPIRATION RATE: 19 BRPM | SYSTOLIC BLOOD PRESSURE: 139 MMHG | TEMPERATURE: 98.6 F | DIASTOLIC BLOOD PRESSURE: 68 MMHG | BODY MASS INDEX: 44.65 KG/M2

## 2024-02-23 DIAGNOSIS — S82.892A CLOSED FRACTURE OF LEFT ANKLE, INITIAL ENCOUNTER: Primary | ICD-10-CM

## 2024-02-23 PROCEDURE — 73610 X-RAY EXAM OF ANKLE: CPT

## 2024-02-23 PROCEDURE — 99283 EMERGENCY DEPT VISIT LOW MDM: CPT

## 2024-02-23 NOTE — ED PROVIDER NOTES
"History  Chief Complaint   Patient presents with    Ankle Pain     Pt \"Last night my puppy slipped out and ended up tripping me. I can barely put any weight on that ankle. I had surgery years ago on the metatarsals on that foot\"      Past Medical History: Left foot pain -  \"bone spur\", Right shoulder pain and right rotator cuff problem, Stress situational, Thalassemia   Past Surgical History: APPENDECTOMY, Left BREAST SURGERY - lump removed noncancer, MOUTH SURGERY, Left HALLUX RIGIDUS W/CHEILECTOMY 1ST MP JT W/IMPLT, 1ST MPJ TOTAL JOINT IMPLAN, Left ROTATOR CUFF REPAIR, Right ROTATOR CUFF REPAIR, gastric bypass patient    Pt presents to ED for further evaluation and treatment of left ankle injury that she sustained last night when her dog tripped her up and she fell, inverting ankle  Patient unable to bear weight due to pain and swelling.  No other injuries or complaints  Has had prior foot surgery years ago   Offered Tylenol Motrin patient does not want at this time in the emergency department        Prior to Admission Medications   Prescriptions Last Dose Informant Patient Reported? Taking?   Multiple Vitamin (Multivitamin Adult) TABS  Self Yes No   Sig: Take 1 tablet by mouth daily   amLODIPine (NORVASC) 5 mg tablet   Yes No   Sig: Take 5 mg by mouth daily      Facility-Administered Medications: None       Past Medical History:   Diagnosis Date    Dental crown present     History of motion sickness     History of pneumonia     Left foot pain     \"bone spur\"    Right shoulder pain     and right rotator cuff problem    Stress     situational    Thalassemia     Wears contact lenses     Wears glasses        Past Surgical History:   Procedure Laterality Date    APPENDECTOMY      BREAST SURGERY Left     lump removed noncancer    MOUTH SURGERY      SC HALLUX RIGIDUS W/CHEILECTOMY 1ST MP JT W/IMPLT Left 8/28/2019    Procedure: 1ST MPJ TOTAL JOINT IMPLANT;  Surgeon: Davey Whittington DPM;  Location: [facility];  Service: " "Podiatry    ROTATOR CUFF REPAIR Left     ROTATOR CUFF REPAIR Right     \"eventually needs replacement\"       Family History   Problem Relation Age of Onset    Thyroid disease Mother         parathyroid removed    Heart disease Father     Hodgkin's lymphoma Sister      I have reviewed and agree with the history as documented.    E-Cigarette/Vaping    E-Cigarette Use Never User      E-Cigarette/Vaping Substances    Nicotine No     THC No     CBD No     Flavoring No     Other No     Unknown No      Social History     Tobacco Use    Smoking status: Former     Current packs/day: 0.00     Types: Cigarettes     Start date:      Quit date:      Years since quittin.1    Smokeless tobacco: Never    Tobacco comments:     quit in 2016    Vaping Use    Vaping status: Never Used   Substance Use Topics    Alcohol use: Yes     Comment: monthly    Drug use: Never       Review of Systems   Constitutional:  Negative for fever.   HENT:  Negative for sore throat.    Respiratory:  Negative for shortness of breath.    Cardiovascular:  Negative for chest pain.   Gastrointestinal:  Negative for abdominal pain and vomiting.   Genitourinary:  Negative for dysuria and frequency.   Musculoskeletal:  Positive for arthralgias, gait problem, joint swelling and myalgias.   Skin:  Negative for wound.   Neurological:  Negative for dizziness.   Psychiatric/Behavioral:  Negative for behavioral problems.    All other systems reviewed and are negative.      Physical Exam  Physical Exam  Vitals and nursing note reviewed.   Constitutional:       General: She is not in acute distress.     Appearance: Normal appearance. She is well-developed.   HENT:      Head: Normocephalic and atraumatic.      Nose: Nose normal.      Mouth/Throat:      Mouth: Mucous membranes are moist.      Pharynx: Oropharynx is clear.   Eyes:      Conjunctiva/sclera: Conjunctivae normal.   Cardiovascular:      Rate and Rhythm: Normal rate and regular rhythm.   Pulmonary:      " Effort: Pulmonary effort is normal.   Musculoskeletal:         General: Swelling, tenderness and signs of injury present. Normal range of motion.      Cervical back: Normal range of motion.      Comments: Mild diffuse tenderness/swelling  noted along lateral malleoli, no pain along medial aspect, toes, 5th MT, proximal lower leg, distal NV intact   Skin:     General: Skin is warm and dry.      Findings: No bruising.   Neurological:      General: No focal deficit present.      Mental Status: She is alert.   Psychiatric:         Behavior: Behavior normal.         Vital Signs  ED Triage Vitals [02/23/24 1034]   Temperature Pulse Respirations Blood Pressure SpO2   98.2 °F (36.8 °C) 88 20 138/65 99 %      Temp Source Heart Rate Source Patient Position - Orthostatic VS BP Location FiO2 (%)   Oral Monitor Sitting Left arm --      Pain Score       8           Vitals:    02/23/24 1034 02/23/24 1229   BP: 138/65 139/68   Pulse: 88 68   Patient Position - Orthostatic VS: Sitting Sitting         Visual Acuity      ED Medications  Medications - No data to display    Diagnostic Studies  Results Reviewed       None                   XR ankle 3+ views LEFT   ED Interpretation by Estephania Pressley PA-C (02/23 1105)   + distal fib fracture      Final Result by Jett Mcdowell MD (02/23 1329)      Nondisplaced distal fibular fracture.            Workstation performed: [id number]                    Procedures  Procedures         ED Course                               SBIRT 20yo+      Flowsheet Row Most Recent Value   Initial Alcohol Screen: US AUDIT-C     1. How often do you have a drink containing alcohol? 2 Filed at: 02/23/2024 1037   2. How many drinks containing alcohol do you have on a typical day you are drinking?  1 Filed at: 02/23/2024 1037   3b. FEMALE Any Age, or MALE 65+: How often do you have 4 or more drinks on one occassion? 2 Filed at: 02/23/2024 1037   Audit-C Score 5 Filed at: 02/23/2024 1037   SHOLA: How many  times in the past year have you...    Used an illegal drug or used a prescription medication for non-medical reasons? Never Filed at: 02/23/2024 1037                      Medical Decision Making  Patient with ankle injury consider sprain, strain, fracture  + distal fib fracture noted ordered posterior splint, crutches.  Patient also asking for prescription for knee scooter or podiatrist  Posterior short leg splint placed by tech, checked by me, distal NV intact, crutches given and pt asking for knee scooter to help her get around    Amount and/or Complexity of Data Reviewed  Radiology: ordered and independent interpretation performed. Decision-making details documented in ED Course.             Disposition  Final diagnoses:   Closed fracture of left ankle, initial encounter - Distal Fib     Time reflects when diagnosis was documented in both MDM as applicable and the Disposition within this note       Time User Action Codes Description Comment    2/23/2024 11:28 AM Estephania Pressley Add [S82.892A] Closed fracture of left ankle, initial encounter     2/23/2024 11:29 AM Estephania Pressley Modify [S82.892A] Closed fracture of left ankle, initial encounter Distal Fib          ED Disposition       ED Disposition   Discharge    Condition   Stable    Date/Time   Fri Feb 23, 2024 1128    Comment   Christa Cooper discharge to home/self care.                   Follow-up Information       Follow up With Specialties Details Why Contact Info Additional Information    [facility]   [address] [address]  [phone]       [facility]   [address] [place] [id number] [phone] [facility] [address] [phone]            Discharge Medication List as of 2/23/2024 11:32 AM        CONTINUE these medications which have NOT CHANGED    Details   amLODIPine (NORVASC) 5 mg  tablet Take 5 mg by mouth daily, Starting Mon 5/1/2023, Historical Med      Multiple Vitamin (Multivitamin Adult) TABS Take 1 tablet by mouth daily, Historical Med             Outpatient Discharge Orders   Durable Medical Equipment       PDMP Review       None            ED Provider  Electronically Signed by             Estephania Pressley PA-C  02/23/24 0209

## 2024-02-23 NOTE — DISCHARGE INSTRUCTIONS
Use Splint and use crutches until follow-up with orthopedic doctor or podiatrist    Use Tylenol 650 mg every 4 hours or Anti-inflammatories like Advil, Motrin, Ibuprofen, Aleve every 6 hours; you can alternate the 2 medications taking something every 3 hours for pain.    Elevate and ice to area to help with pain and swelling

## 2024-02-28 ENCOUNTER — OFFICE VISIT (OUTPATIENT)
Dept: OBGYN CLINIC | Facility: CLINIC | Age: 59
End: 2024-02-28
Payer: COMMERCIAL

## 2024-02-28 VITALS
SYSTOLIC BLOOD PRESSURE: 120 MMHG | BODY MASS INDEX: 34.49 KG/M2 | HEART RATE: 76 BPM | DIASTOLIC BLOOD PRESSURE: 82 MMHG | HEIGHT: 64 IN | WEIGHT: 202 LBS

## 2024-02-28 DIAGNOSIS — S82.62XA CLOSED AVULSION FRACTURE OF LATERAL MALLEOLUS OF LEFT FIBULA, INITIAL ENCOUNTER: ICD-10-CM

## 2024-02-28 DIAGNOSIS — S93.492A SPRAIN OF ANTERIOR TALOFIBULAR LIGAMENT OF LEFT ANKLE, INITIAL ENCOUNTER: Primary | ICD-10-CM

## 2024-02-28 DIAGNOSIS — S82.892A CLOSED FRACTURE OF LEFT ANKLE, INITIAL ENCOUNTER: ICD-10-CM

## 2024-02-28 PROBLEM — S93.402A SPRAIN OF UNSPECIFIED LIGAMENT OF LEFT ANKLE, INITIAL ENCOUNTER: Status: ACTIVE | Noted: 2024-02-28

## 2024-02-28 PROCEDURE — 99203 OFFICE O/P NEW LOW 30 MIN: CPT | Performed by: ORTHOPAEDIC SURGERY

## 2024-02-28 PROCEDURE — 27786 TREATMENT OF ANKLE FRACTURE: CPT | Performed by: ORTHOPAEDIC SURGERY

## 2024-02-28 RX ORDER — PANTOPRAZOLE SODIUM 40 MG/1
40 TABLET, DELAYED RELEASE ORAL DAILY
COMMUNITY
Start: 2024-02-17

## 2024-02-28 NOTE — PATIENT INSTRUCTIONS
Weightbearing as tolerated in CAM boot  Do not need to wear the boot for sleep or showering but should wear it any time you are walking on it.  Once you feel comfortable getting out of the boot and into a sneaker you should do it.  Do this no later than 3/12/24    Recommend taking the following supplements: Vitamin D3- 4000 units per day and Calcium 1200 mg per day. This will help with bone healing.     Avoid NSAIDs like Motrin/Aleve/Ibuprofen.  Avoid steroids/nicotine products/ rheumatoid medications like DMARDs if possible.    Aspirin 81mg for blood clot prevention.  While in the boot    Knee high compression stocking 20-30mm HG of pressure

## 2024-02-28 NOTE — ED PROCEDURE NOTE
"Procedure  Splint application    Date/Time: 2/28/2024 2:09 PM    Performed by: Estephania Pressley PA-C  Authorized by: Estephania Pressley PA-C  [place] Protocol:  Procedure performed by: (splint placed by ED tech, checked by me)  Consent: Verbal consent obtained.  Risks and benefits: risks, benefits and alternatives were discussed  Consent given by: patient  Time out: Immediately prior to procedure a \"time out\" was called to verify the correct patient, procedure, equipment, support staff and site/side marked as required.  Patient understanding: patient states understanding of the procedure being performed  Patient identity confirmed: verbally with patient    Pre-procedure details:     Sensation:  Normal  Procedure details:     Laterality:  Left    Location:  Ankle    Ankle:  L ankle    Splint type:  Short leg    Supplies:  Ortho-Glass and elastic bandage  Post-procedure details:     Pain:  Improved    Sensation:  Normal    Patient tolerance of procedure:  Tolerated well, no immediate complications  Comments:      Checked by me, NV intact                   Estephania Pressley PA-C  02/28/24 1411    "

## 2024-02-28 NOTE — PROGRESS NOTES
James R Lachman, M.D.  Attending, Orthopaedic Surgery  Foot and Ankle  [facility]      Assessment:     Encounter Diagnoses   Name Primary?    Closed fracture of left ankle, initial encounter     Sprain of anterior talofibular ligament of left ankle, initial encounter Yes    Closed avulsion fracture of lateral malleolus of left fibula, initial encounter           Plan:     The patient has sustained a sprain of the left ATFL and possibly CFL  We will begin physical therapy as soon as the patient tolerates- Order placed  Instructions for Home stretching program provided in AVSS  Instructions given for rest, ice 20mins/hr, elevation, and Ace wrap given for compression  ASA 81 mg daily while in boot.  The patient was prescribed a CAM boot to be worn at all times while not in PT  Work/School restrictions given      Follow up will be in 7 weeks.     Fracture / Dislocation Treatment    Date/Time: 2/28/2024 9:30 AM    Performed by: James R Lachman, MD  Authorized by: James R Lachman, MD    Patient Location:  [facility] Protocol:  Consent: Verbal consent obtained.  Patient understanding: patient states understanding of the procedure being performed  Site marked: the operative site was marked  Patient identity confirmed: verbally with patient    Injury location:  Ankle  Location details:  Left ankle  Injury type:  Fracture  Fracture type: lateral malleolus    Fracture type: lateral malleolus    Distal perfusion: normal    Neurological function: normal    Range of motion: reduced    Local anesthesia used?: No    Manipulation performed?: No    Immobilization:  Brace  Splint type:  Short leg  Neurovascular status: Neurovascularly intact    Distal perfusion: normal    Neurological function: normal    Range of motion: unchanged    Patient tolerance:  Patient tolerated the procedure well with no immediate complications          Subjective:    Chief Complaint:    Chief Complaint   Patient presents  with    Left Ankle - Pain     Happened Thursday night. In an ace wrap.         Christa Cooper is a 59 y.o. female referred by ER for evaluation and treatment of an injury to the left ankle. This is evaluated as a personal injury. The injury occurred 6 days ago, and occurred while tripped over dog.  The patient states the ankle rolled inward at the time of injury.  She did not hear or sense a pop or snap at the time of the injury. The patient had sharp pain immediately following incident. denies pain of ankle since injury. Notes moderate swelling of the ankle since the injury, swelling improving. She has treated the ankle with Elevation, Rest. Pain is localized to the lateral  malleolar area. She has not sprained this ankle in the past. Has been NWB in splint since ER visit 2/23/2024.    Pain/symptom location: left ankle  Pain/symptom quality: sharp; has subsided since initial injury   Pain/symptom severity: mild  Pain/symptom timing:  Worse during the day when active  Pain/symptom conext:  Worse with activites and work  Pain/symptom modifying factors:  Rest makes better, activities make worse.  Pain/symptom associated signs/symptoms: none    Outside reports reviewed: ER records.    Foot and Ankle Surgical History:   Left 1st MP joint cheilectomy 2019    Past Medical, Surgical and Social History:  Past Medical History:  has a past medical history of Dental crown present, History of motion sickness, History of pneumonia, Left foot pain, Right shoulder pain, Stress, Thalassemia, Wears contact lenses, and Wears glasses.  Problem List: does not have any pertinent problems on file.  Past Surgical History:  has a past surgical history that includes Mouth surgery; Rotator cuff repair (Left); Rotator cuff repair (Right); Appendectomy; Breast surgery (Left); and pr hallux rigidus w/cheilectomy 1st mp jt w/implt (Left, 8/28/2019).  Family History: family history includes Heart disease in her father; Hodgkin's lymphoma in her  "sister; Thyroid disease in her mother.  Social History:  reports that she quit smoking about 8 years ago. Her smoking use included cigarettes. She started smoking about 41 years ago. She has never used smokeless tobacco. She reports current alcohol use. She reports that she does not use drugs.  Current Medications: has a current medication list which includes the following prescription(s): multivitamin adult, pantoprazole, and amlodipine.  Allergies: is allergic to erythromycin, morphine, and other.     Review of Systems:  General- denies fever/chills  HEENT- denies hearing loss or sore throat  Eyes- denies eye pain or visual disturbances, denies red eyes  Respiratory- denies cough or SOB  Cardio- denies chest pain or palpitations  GI- denies abdominal pain  Endocrine- denies urinary frequency  Urinary- denies pain with urination  Musculoskeletal- Negative except noted above  Skin- denies rashes or wounds  Neurological- denies dizziness or headache  Psychiatric- denies anxiety or difficulty concentrating    Objective:        /82   Pulse 76   Ht 5' 4\" (1.626 m)   Wt 91.6 kg (202 lb)   LMP 07/19/2019 (Approximate) Comment: urine hcg negative   BMI 34.67 kg/m²   General/Constitutional: No apparent distress: well-nourished and well developed.  Eyes: normal ocular motion  Lymphatic: No appreciable lymphadenopathy  Respiratory: Non-labored breathing  Vascular: No edema, swelling or tenderness, except as noted in detailed exam.  Integumentary: No impressive skin lesions present, except as noted in detailed exam.  Neuro: No ataxia or abnormal movements  Psych: Normal mood and affect, oriented to person, place and time.  MSK: normal other than stated in HPI and exam      Gait:  Abnormal. The patient cannot bear weight on the injured extremity.   Left Ankle  Proximal Fibula:   no tenderness noted   Edema:   Moderate swelling circumferentially in the ankle   Ecchymosis:   Moderate in lateral ankle   Crepitus  None "   Active ROM:  50% of normal    Passive ROM:   75% of normal    Palpation:  Significant tenderness of the anterolateral ligaments. TTP distal fibula.   Stability.:   No joint laxity. Drawer sign equal to unaffected ankle.   Syndosmosis:   syndesmotic ligament IS NOT tender   Sensation:     Intact in all distributions   Pulses:  normal DP and PT pulses       Imaging  X-ray of the ankle/foot: 3 views of the ankle reveal a stable mortise joint, moderate soft tissue swelling, and  evidence of non-displaced distal fibular avulsion fracture. Reviewed by me personally.        Scribe Attestation      I,:  Shana Morse PA-C am acting as a scribe while in the presence of the attending physician.:       I,:  James R Lachman, MD personally performed the services described in this documentation    as scribed in my presence.:

## 2024-03-18 ENCOUNTER — EVALUATION (OUTPATIENT)
Dept: PHYSICAL THERAPY | Facility: CLINIC | Age: 59
End: 2024-03-18
Payer: COMMERCIAL

## 2024-03-18 DIAGNOSIS — S93.492A SPRAIN OF ANTERIOR TALOFIBULAR LIGAMENT OF LEFT ANKLE, INITIAL ENCOUNTER: Primary | ICD-10-CM

## 2024-03-18 PROCEDURE — 97161 PT EVAL LOW COMPLEX 20 MIN: CPT | Performed by: PHYSICAL THERAPIST

## 2024-03-18 PROCEDURE — 97140 MANUAL THERAPY 1/> REGIONS: CPT | Performed by: PHYSICAL THERAPIST

## 2024-03-18 PROCEDURE — 97110 THERAPEUTIC EXERCISES: CPT | Performed by: PHYSICAL THERAPIST

## 2024-03-18 NOTE — PROGRESS NOTES
PT Evaluation     Today's date: 3/18/2024  Patient name: Christa Cooper  : 1965  MRN: [id number]  Referring provider: Shana Morse P*  Dx:   Encounter Diagnosis     ICD-10-CM    1. Sprain of anterior talofibular ligament of left ankle, initial encounter  S93.492A Ambulatory Referral to Physical Therapy                     Assessment  Assessment details: Patient presents with signs and symptoms consistent with referring diagnosis.   She presents with ankle hypomobility and altered gait and balance s/p avulsion fx/anle sprain.  Positive prognostic indicators include:  Motivated to improve, improved with taping technique today.  Negative prognostic indicators include: high copay will limit in person PT sessions, focus on HEP.  She presents with: pain, decreased: ROM, strength and  functional capacity.  She requires skilled PT to address these deficits and restore maximal functional capacity.  Thank you for this pleasant referral.    .  Impairments: abnormal or restricted ROM, activity intolerance, impaired physical strength, lacks appropriate home exercise program and pain with function  Understanding of Dx/Px/POC: good   Prognosis: good    Goals  ST-6 weeks  1.  Patient to be independent with HEP.  2.  Decrease pain at least 2 subjective levels.      LT-12 weeks.    1.   Patient to voice comfort with self management of condition.  2.  75% or > decreased pain.  3.  75% or > decreased functional deficits.  4.  Normalize AROM of all deficit planes.  5.  Normalize strength.  9.  Normalize Gait    Plan  Patient would benefit from: skilled PT  Referral necessary: No  Planned modality interventions: cryotherapy  Planned therapy interventions: IADL retraining, joint mobilization, manual therapy, motor coordination training, neuromuscular re-education, patient education, postural training, self care, strengthening, stretching, therapeutic activities, therapeutic exercise, home exercise program,  "flexibility, ADL training, balance and body mechanics training  Frequency: 2x week  Duration in weeks: 8  Treatment plan discussed with: patient        Subjective Evaluation    History of Present Illness  Onset date: 24.  Mechanism of injury: Chief Complaint: R ankle pain    History:  Patient twisted her foot on 24.  It swelled immediately.  She was seen in the ER the following day and she had x-rays and was dx with an avulsion fracture of distal fibula.  She was NWB x 1 week then was placed in a CAM boot x 2 weeks.  Today is her 1st day out of the boot and notes her pain is increased today.  She works as a  and has continued to work (mostly sitting).  She generally stays active.       PMH:  L Great toe joint replacement 2019,      Aggravating factors: Steps (recriprocal), dog bumping into foot   Relieving factors: NWB, elevation    Functional Deficits:  Mow lawn,     Patient Goals:  \"Not be broke\"     Quality of life: good    Pain  At best pain ratin  At worst pain ratin  Location: L ankle (lateral)          Objective     Active Range of Motion     Right Ankle/Foot   Dorsiflexion (ke): 8 degrees with pain  Dorsiflexion (kf): 8 degrees with pain  Plantar flexion: 40 degrees   Inversion: 20 degrees   Eversion: 5 degrees   Great toe extension: 60 degrees     Passive Range of Motion     Right Ankle/Foot    Dorsiflexion (ke): 12 degrees   Dorsiflexion (kf): 12 degrees    Plantar flexion: 50 degrees   Inversion: 30 degrees   Eversion: 15 degrees   Great toe extension: 70 degrees     Strength/Myotome Testing     Right Ankle/Foot   Dorsiflexion: 3+  Plantar flexion: 2+  Inversion: 3+  Eversion: 3+  Great toe extension: 3+    General Comments:      Ankle/Foot Comments   Gait: Antalgic, decreased R stance and push off  SLS 1 sec  Pain with steps and squat    Gait improved to 2/10 pain (from 5/10) post taping    Ankle girth: L 36 cm; R 34 cm  (+) TTP Lateral Malleolus, ATFL, " CFL                 Precautions: High copay- limiting in person sessions      Manuals 3/18            Distal Fibular Post Glide Taping C            Pt ed in taping                                       Neuro Re-Ed             SLS                                                                                           Ther Ex             HEP                          Bike             Wobble Bd             HR             HR on leg press             Ankle TB 4 way                          Ther Activity                                       Gait Training             TM             Stairs             Modalities

## 2024-06-18 ENCOUNTER — TELEPHONE (OUTPATIENT)
Dept: CARDIOLOGY CLINIC | Facility: CLINIC | Age: 59
End: 2024-06-18

## (undated) DEVICE — NEEDLE 25G X 1 1/2

## (undated) DEVICE — NEEDLE 18 G X 1 1/2

## (undated) DEVICE — BETHLEHEM UNIVERSAL  MIONR EXT: Brand: CARDINAL HEALTH

## (undated) DEVICE — OCCLUSIVE GAUZE STRIP,3% BISMUTH TRIBROMOPHENATE IN PETROLATUM BLEND: Brand: XEROFORM

## (undated) DEVICE — GLOVE SRG BIOGEL 7

## (undated) DEVICE — CUFF TOURNIQUET 18 X 4 IN QUICK CONNECT DISP 1 BLADDER

## (undated) DEVICE — CAST PADDING 4 IN SYNTHETIC NON-STRL

## (undated) DEVICE — BLADE SAGITTAL 25.6 X 9.5MM

## (undated) DEVICE — 2000CC GUARDIAN II: Brand: GUARDIAN

## (undated) DEVICE — 10FR FRAZIER SUCTION HANDLE: Brand: CARDINAL HEALTH

## (undated) DEVICE — SYRINGE 10ML LL

## (undated) DEVICE — STOCKINETTE REGULAR

## (undated) DEVICE — INTENDED FOR TISSUE SEPARATION, AND OTHER PROCEDURES THAT REQUIRE A SHARP SURGICAL BLADE TO PUNCTURE OR CUT.: Brand: BARD-PARKER ® CARBON RIB-BACK BLADES

## (undated) DEVICE — PENCIL ELECTROSURG E-Z CLEAN -0035H

## (undated) DEVICE — ACE WRAP 4 IN UNSTERILE

## (undated) DEVICE — 4.0 MM X 2.35 MM X 70.0 MM OVAL CARBIDE BUR, 8 FLUTE

## (undated) DEVICE — CHLORAPREP HI-LITE 26ML ORANGE

## (undated) DEVICE — GAUZE SPONGES,USP TYPE VII GAUZE, 12 PLY: Brand: CURITY

## (undated) DEVICE — KERLIX BANDAGE ROLL: Brand: KERLIX

## (undated) DEVICE — TUBING SUCTION 5MM X 12 FT